# Patient Record
Sex: FEMALE | Race: WHITE | NOT HISPANIC OR LATINO | Employment: FULL TIME | URBAN - METROPOLITAN AREA
[De-identification: names, ages, dates, MRNs, and addresses within clinical notes are randomized per-mention and may not be internally consistent; named-entity substitution may affect disease eponyms.]

---

## 2017-05-15 ENCOUNTER — PROCEDURE VISIT (OUTPATIENT)
Dept: OBSTETRICS AND GYNECOLOGY | Facility: CLINIC | Age: 47
End: 2017-05-15

## 2017-05-15 VITALS
DIASTOLIC BLOOD PRESSURE: 70 MMHG | BODY MASS INDEX: 22.86 KG/M2 | SYSTOLIC BLOOD PRESSURE: 120 MMHG | WEIGHT: 129 LBS | HEIGHT: 63 IN

## 2017-05-15 DIAGNOSIS — Z86.19 HISTORY OF HERPES GENITALIS: ICD-10-CM

## 2017-05-15 DIAGNOSIS — Z01.419 WELL FEMALE EXAM WITH ROUTINE GYNECOLOGICAL EXAM: Primary | ICD-10-CM

## 2017-05-15 DIAGNOSIS — N95.1 HOT FLASH, MENOPAUSAL: ICD-10-CM

## 2017-05-15 PROCEDURE — 99396 PREV VISIT EST AGE 40-64: CPT | Performed by: OBSTETRICS & GYNECOLOGY

## 2017-05-15 RX ORDER — VALACYCLOVIR HYDROCHLORIDE 500 MG/1
TABLET, FILM COATED ORAL
COMMUNITY
Start: 2015-06-15 | End: 2017-05-15 | Stop reason: SDUPTHER

## 2017-05-15 RX ORDER — VALACYCLOVIR HYDROCHLORIDE 500 MG/1
500 TABLET, FILM COATED ORAL DAILY
Qty: 90 TABLET | Refills: 3 | Status: SHIPPED | OUTPATIENT
Start: 2017-05-15 | End: 2018-03-14 | Stop reason: SDUPTHER

## 2018-03-14 DIAGNOSIS — Z86.19 HISTORY OF HERPES GENITALIS: ICD-10-CM

## 2018-03-14 RX ORDER — VALACYCLOVIR HYDROCHLORIDE 500 MG/1
TABLET, FILM COATED ORAL
Qty: 90 TABLET | Refills: 3 | Status: SHIPPED | OUTPATIENT
Start: 2018-03-14 | End: 2018-06-25 | Stop reason: SDUPTHER

## 2018-06-12 ENCOUNTER — OFFICE VISIT (OUTPATIENT)
Dept: FAMILY MEDICINE CLINIC | Facility: CLINIC | Age: 48
End: 2018-06-12

## 2018-06-12 VITALS
RESPIRATION RATE: 16 BRPM | OXYGEN SATURATION: 98 % | TEMPERATURE: 98.3 F | HEART RATE: 62 BPM | WEIGHT: 130 LBS | SYSTOLIC BLOOD PRESSURE: 110 MMHG | BODY MASS INDEX: 23.92 KG/M2 | HEIGHT: 62 IN | DIASTOLIC BLOOD PRESSURE: 70 MMHG

## 2018-06-12 DIAGNOSIS — Z00.00 ANNUAL PHYSICAL EXAM: Primary | ICD-10-CM

## 2018-06-12 DIAGNOSIS — B18.1 HEPATITIS B CARRIER (HCC): ICD-10-CM

## 2018-06-12 LAB
25(OH)D3 SERPL-MCNC: 22.1 NG/ML
ALBUMIN SERPL-MCNC: 4.46 G/DL (ref 3.2–4.8)
ALBUMIN/GLOB SERPL: 1.5 G/DL (ref 1.5–2.5)
ALP SERPL-CCNC: 79 U/L (ref 25–100)
ALT SERPL W P-5'-P-CCNC: 35 U/L (ref 7–40)
ANION GAP SERPL CALCULATED.3IONS-SCNC: 9 MMOL/L (ref 3–11)
ARTICHOKE IGE QN: 122 MG/DL (ref 0–130)
AST SERPL-CCNC: 38 U/L (ref 0–33)
BASOPHILS # BLD AUTO: 0.02 10*3/MM3 (ref 0–0.2)
BASOPHILS NFR BLD AUTO: 0.4 % (ref 0–1)
BILIRUB BLD-MCNC: NEGATIVE MG/DL
BILIRUB SERPL-MCNC: 0.8 MG/DL (ref 0.3–1.2)
BUN BLD-MCNC: 11 MG/DL (ref 9–23)
BUN/CREAT SERPL: 19.3 (ref 7–25)
CALCIUM SPEC-SCNC: 9.5 MG/DL (ref 8.7–10.4)
CHLORIDE SERPL-SCNC: 102 MMOL/L (ref 99–109)
CHOLEST SERPL-MCNC: 198 MG/DL (ref 0–200)
CLARITY, POC: CLEAR
CO2 SERPL-SCNC: 30 MMOL/L (ref 20–31)
COLOR UR: YELLOW
CREAT BLD-MCNC: 0.57 MG/DL (ref 0.6–1.3)
DEPRECATED RDW RBC AUTO: 46 FL (ref 37–54)
EOSINOPHIL # BLD AUTO: 0.09 10*3/MM3 (ref 0–0.3)
EOSINOPHIL NFR BLD AUTO: 1.6 % (ref 0–3)
ERYTHROCYTE [DISTWIDTH] IN BLOOD BY AUTOMATED COUNT: 13.1 % (ref 11.3–14.5)
GFR SERPL CREATININE-BSD FRML MDRD: 113 ML/MIN/1.73
GLOBULIN UR ELPH-MCNC: 3 GM/DL
GLUCOSE BLD-MCNC: 85 MG/DL (ref 70–100)
GLUCOSE UR STRIP-MCNC: NEGATIVE MG/DL
HCT VFR BLD AUTO: 42.7 % (ref 34.5–44)
HDLC SERPL-MCNC: 78 MG/DL (ref 40–60)
HGB BLD-MCNC: 14.1 G/DL (ref 11.5–15.5)
IMM GRANULOCYTES # BLD: 0.01 10*3/MM3 (ref 0–0.03)
IMM GRANULOCYTES NFR BLD: 0.2 % (ref 0–0.6)
KETONES UR QL: NEGATIVE
LEUKOCYTE EST, POC: NEGATIVE
LYMPHOCYTES # BLD AUTO: 1.79 10*3/MM3 (ref 0.6–4.8)
LYMPHOCYTES NFR BLD AUTO: 32.1 % (ref 24–44)
MCH RBC QN AUTO: 31.8 PG (ref 27–31)
MCHC RBC AUTO-ENTMCNC: 33 G/DL (ref 32–36)
MCV RBC AUTO: 96.2 FL (ref 80–99)
MONOCYTES # BLD AUTO: 0.41 10*3/MM3 (ref 0–1)
MONOCYTES NFR BLD AUTO: 7.4 % (ref 0–12)
NEUTROPHILS # BLD AUTO: 3.26 10*3/MM3 (ref 1.5–8.3)
NEUTROPHILS NFR BLD AUTO: 58.5 % (ref 41–71)
NITRITE UR-MCNC: NEGATIVE MG/ML
PH UR: 7 [PH] (ref 5–8)
PLATELET # BLD AUTO: 194 10*3/MM3 (ref 150–450)
PMV BLD AUTO: 11.6 FL (ref 6–12)
POTASSIUM BLD-SCNC: 4.2 MMOL/L (ref 3.5–5.5)
PROT SERPL-MCNC: 7.5 G/DL (ref 5.7–8.2)
PROT UR STRIP-MCNC: NEGATIVE MG/DL
RBC # BLD AUTO: 4.44 10*6/MM3 (ref 3.89–5.14)
RBC # UR STRIP: NEGATIVE /UL
SODIUM BLD-SCNC: 141 MMOL/L (ref 132–146)
SP GR UR: 1.02 (ref 1–1.03)
TRIGL SERPL-MCNC: 49 MG/DL (ref 0–150)
TSH SERPL DL<=0.05 MIU/L-ACNC: 2 MIU/ML (ref 0.35–5.35)
UROBILINOGEN UR QL: NORMAL
WBC NRBC COR # BLD: 5.57 10*3/MM3 (ref 3.5–10.8)

## 2018-06-12 PROCEDURE — 84443 ASSAY THYROID STIM HORMONE: CPT | Performed by: FAMILY MEDICINE

## 2018-06-12 PROCEDURE — 82105 ALPHA-FETOPROTEIN SERUM: CPT | Performed by: FAMILY MEDICINE

## 2018-06-12 PROCEDURE — 80053 COMPREHEN METABOLIC PANEL: CPT | Performed by: FAMILY MEDICINE

## 2018-06-12 PROCEDURE — 80061 LIPID PANEL: CPT | Performed by: FAMILY MEDICINE

## 2018-06-12 PROCEDURE — 99396 PREV VISIT EST AGE 40-64: CPT | Performed by: FAMILY MEDICINE

## 2018-06-12 PROCEDURE — 93000 ELECTROCARDIOGRAM COMPLETE: CPT | Performed by: FAMILY MEDICINE

## 2018-06-12 PROCEDURE — 36415 COLL VENOUS BLD VENIPUNCTURE: CPT | Performed by: FAMILY MEDICINE

## 2018-06-12 PROCEDURE — 82306 VITAMIN D 25 HYDROXY: CPT | Performed by: FAMILY MEDICINE

## 2018-06-12 PROCEDURE — 81003 URINALYSIS AUTO W/O SCOPE: CPT | Performed by: FAMILY MEDICINE

## 2018-06-12 PROCEDURE — 85025 COMPLETE CBC W/AUTO DIFF WBC: CPT | Performed by: FAMILY MEDICINE

## 2018-06-12 PROCEDURE — 87517 HEPATITIS B DNA QUANT: CPT | Performed by: FAMILY MEDICINE

## 2018-06-13 LAB
AFP-TM SERPL-MCNC: 3.9 NG/ML (ref 0–8.3)
HBV DNA SERPL NAA+PROBE-ACNC: 2040 IU/ML
LOG10 HBV IU/ML: 3.31 LOG10 IU/ML
TEST INFORMATION: NORMAL

## 2018-06-25 ENCOUNTER — PROCEDURE VISIT (OUTPATIENT)
Dept: OBSTETRICS AND GYNECOLOGY | Facility: CLINIC | Age: 48
End: 2018-06-25

## 2018-06-25 VITALS — SYSTOLIC BLOOD PRESSURE: 100 MMHG | WEIGHT: 131 LBS | DIASTOLIC BLOOD PRESSURE: 64 MMHG | BODY MASS INDEX: 23.96 KG/M2

## 2018-06-25 DIAGNOSIS — Z01.419 WOMEN'S ANNUAL ROUTINE GYNECOLOGICAL EXAMINATION: Primary | ICD-10-CM

## 2018-06-25 DIAGNOSIS — Z98.890 STATUS POST ENDOMETRIAL ABLATION: ICD-10-CM

## 2018-06-25 DIAGNOSIS — Z86.19 HISTORY OF HERPES GENITALIS: ICD-10-CM

## 2018-06-25 DIAGNOSIS — I82.602 ARM VEIN BLOOD CLOT, LEFT: ICD-10-CM

## 2018-06-25 DIAGNOSIS — Z86.19 HISTORY OF HEPATITIS B: ICD-10-CM

## 2018-06-25 PROCEDURE — 99396 PREV VISIT EST AGE 40-64: CPT | Performed by: OBSTETRICS & GYNECOLOGY

## 2018-06-25 RX ORDER — VALACYCLOVIR HYDROCHLORIDE 500 MG/1
500 TABLET, FILM COATED ORAL DAILY
Qty: 90 TABLET | Refills: 3 | Status: SHIPPED | OUTPATIENT
Start: 2018-06-25 | End: 2019-06-26 | Stop reason: SDUPTHER

## 2018-06-25 NOTE — PROGRESS NOTES
Subjective   Chief Complaint   Patient presents with   • Gynecologic Exam     Brittaney Augustin is a 48 y.o. year old  presenting to be seen for her annual exam. Patient has using Essure for birth control and is undergone endometrial ablation.  She has no menstrual cycles at this time.  She has no GYN problems and presents for an annual exam. Patient has a history of hepatitis B.  She needs a mammogram and will schedule her own at [facility].    SEXUAL Hx:  She is currently sexually active.  In the past year there has not been new sexual partners.    Condoms are not typically used.  She would not like to be screened for STD's at today's exam.  Current birth control method: Essure.  She is happy with her current method of contraception and does not want to discuss alternative methods of contraception.  MENSTRUAL Hx:  No LMP recorded. Patient has had an ablation.  In the past 6 months her cycles have been absent.  Her menstrual flow has been absent.   Each month on average there are roughly 0 day(s) of very heavy flow.    Intermenstrual bleeding is absent.    Post-coital bleeding is absent.  Dysmenorrhea: none  PMS: none  Her cycles are not a source of concern for her that she wishes to discuss today.  HEALTH Hx:  She exercises regularly:yes.  She wears her seat belt:yes.  She has concerns about domestic violence: no.  OTHER COMPLAINTS:  Nothing else    The following portions of the patient's history were reviewed and updated as appropriate:problem list, current medications, allergies, past family history, past medical history, past social history and past surgical history.    Smoking status: Never Smoker                                                              Smokeless tobacco: Never Used                        Review of Systems  Review of Systems - History obtained from the patient  General ROS: negative  Psychological ROS: negative  ENT ROS: negative  Allergy and Immunology ROS:  negative  Hematological and Lymphatic ROS: positive for - bruising  Endocrine ROS: negative  Breast ROS: negative for breast lumps  Respiratory ROS: no cough, shortness of breath, or wheezing  Cardiovascular ROS: no chest pain or dyspnea on exertion  Gastrointestinal ROS: no abdominal pain, change in bowel habits, or black or bloody stools  Genito-Urinary ROS: no dysuria, trouble voiding, or hematuria  Musculoskeletal ROS: negative  Neurological ROS: no TIA or stroke symptoms  Dermatological ROS: negative        Objective   /64 (Patient Position: Sitting)   Wt 59.4 kg (131 lb)   BMI 23.96 kg/m²     General:  well developed; well nourished  no acute distress   Skin:  No suspicious lesions seen   Thyroid: normal to inspection and palpation   Breasts:  Examined in supine position  Symmetric without masses or skin dimpling  Nipples normal without inversion, lesions or discharge  There are no palpable axillary nodes   Abdomen: soft, non-tender; no masses  no umbilical or inginual hernias are present  no hepato-splenomegaly   Heart: regular rate and rhythm, S1, S2 normal, no murmur, click, rub or gallop   Lungs: clear to auscultation   Pelvis: Clinical staff was present for exam  External genitalia:  normal appearance of the external genitalia including Bartholin's and Metompkin's glands.  :  urethral meatus normal;  Vaginal:  normal pink mucosa without prolapse or lesions. discharge present -  white;  Cervix:  normal appearance. Pap smear was done  Uterus:  normal size, shape and consistency.  Adnexa:  normal bimanual exam of the adnexa.  Rectal:  digital rectal exam not performed; anus visually normal appearing.          Assessment/Plan   Brittaney was seen today for gynecologic exam.    Diagnoses and all orders for this visit:    Women's annual routine gynecological examination  -     Liquid-based Pap Smear, Screening    Status post endometrial ablation    History of herpes genitalis  -     valACYclovir (VALTREX)  500 MG tablet; Take 1 tablet by mouth Daily.    History of hepatitis B    Arm vein blood clot, left         Return in 1 year    This note was electronically signed.    Nikko Meza MD   June 25, 2018

## 2018-06-28 ENCOUNTER — OFFICE VISIT (OUTPATIENT)
Dept: FAMILY MEDICINE CLINIC | Facility: CLINIC | Age: 48
End: 2018-06-28

## 2018-06-28 VITALS
SYSTOLIC BLOOD PRESSURE: 100 MMHG | HEART RATE: 78 BPM | OXYGEN SATURATION: 98 % | TEMPERATURE: 99 F | DIASTOLIC BLOOD PRESSURE: 58 MMHG | HEIGHT: 62 IN | WEIGHT: 136.4 LBS | BODY MASS INDEX: 25.1 KG/M2 | RESPIRATION RATE: 20 BRPM

## 2018-06-28 DIAGNOSIS — L03.113 CELLULITIS OF RIGHT UPPER EXTREMITY: Primary | ICD-10-CM

## 2018-06-28 PROCEDURE — 99213 OFFICE O/P EST LOW 20 MIN: CPT | Performed by: FAMILY MEDICINE

## 2018-06-28 RX ORDER — CEPHALEXIN 500 MG/1
500 CAPSULE ORAL 2 TIMES DAILY
Qty: 10 CAPSULE | Refills: 0
Start: 2018-06-28 | End: 2018-10-17

## 2018-10-17 ENCOUNTER — OFFICE VISIT (OUTPATIENT)
Dept: FAMILY MEDICINE CLINIC | Facility: CLINIC | Age: 48
End: 2018-10-17

## 2018-10-17 VITALS
HEIGHT: 62 IN | DIASTOLIC BLOOD PRESSURE: 80 MMHG | BODY MASS INDEX: 24.77 KG/M2 | OXYGEN SATURATION: 97 % | HEART RATE: 91 BPM | RESPIRATION RATE: 20 BRPM | TEMPERATURE: 98.9 F | SYSTOLIC BLOOD PRESSURE: 118 MMHG | WEIGHT: 134.6 LBS

## 2018-10-17 DIAGNOSIS — K13.0 CHEILITIS: Primary | ICD-10-CM

## 2018-10-17 PROCEDURE — 99212 OFFICE O/P EST SF 10 MIN: CPT | Performed by: NURSE PRACTITIONER

## 2018-10-17 RX ORDER — INFLUENZA A VIRUS A/MICHIGAN/45/2015 X-275 (H1N1) ANTIGEN (FORMALDEHYDE INACTIVATED), INFLUENZA A VIRUS A/SINGAPORE/INFIMH-16-0019/2016 IVR-186 (H3N2) ANTIGEN (FORMALDEHYDE INACTIVATED), INFLUENZA B VIRUS B/PHUKET/3073/2013 ANTIGEN (FORMALDEHYDE INACTIVATED), AND INFLUENZA B VIRUS B/MARYLAND/15/2016 BX-69A ANTIGEN (FORMALDEHYDE INACTIVATED) 15; 15; 15; 15 UG/.5ML; UG/.5ML; UG/.5ML; UG/.5ML
INJECTION, SUSPENSION INTRAMUSCULAR
COMMUNITY
Start: 2018-10-13 | End: 2019-06-18

## 2018-10-17 NOTE — PROGRESS NOTES
"Subjective   Brittaney Augustin is a 48 y.o. female.   Chief Complaint   Patient presents with   • iritated lip      History of Present Illness   Lower lip is irritated - has cracks in it - started while on vacation - 10/06 [place] - it has gotten a little better.   Reports it started after using a lip stick.   It is painful with salty foods or high acid.   She has been using chapstick.       The following portions of the patient's history were reviewed and updated as appropriate: allergies, current medications, past family history, past medical history, past social history, past surgical history and problem list.    Review of Systems   Constitutional: Negative.    HENT: Negative.    Eyes: Negative.    Respiratory: Negative.    Cardiovascular: Negative.    Gastrointestinal: Negative.    Skin: Positive for color change.        Cracks in the lower lip -    Neurological: Negative.    Psychiatric/Behavioral: Negative.    Hx of herpes - takes daily valtrex. -     Objective   No Known Allergies  Visit Vitals  /80   Pulse 91   Temp 98.9 °F (37.2 °C) (Temporal Artery )   Resp 20   Ht 157.5 cm (62\")   Wt 61.1 kg (134 lb 9.6 oz)   SpO2 97%   BMI 24.62 kg/m²       Physical Exam   Constitutional: She is oriented to person, place, and time. She appears well-developed and well-nourished.   HENT:   Head: Normocephalic.   Eyes: Pupils are equal, round, and reactive to light.   Neck: Normal range of motion.   Cardiovascular: Normal rate and regular rhythm.    Pulmonary/Chest: Effort normal and breath sounds normal.   Neurological: She is alert and oriented to person, place, and time.   Skin: Skin is warm and dry. Capillary refill takes less than 2 seconds.   Cracks in the lower lip - consistent with common cheilitis   No active bleeding.      Psychiatric: She has a normal mood and affect. Her behavior is normal.   Vitals reviewed.      Assessment/Plan   Brittaney was seen today for iritated lip.    Diagnoses and all orders for " this visit:    Cheilitis    treatment of choice is Vasoline /petrolium jelly to the lip as a  barrier . Apply numerous times daily.     Follow up if you fail to improve.     Increase water intake.                Patient Instructions   Use vasoline lip therapy as needed to lips as a barrier.   Increase water intake         Shannon Willett, APRN

## 2019-05-08 ENCOUNTER — TRANSCRIBE ORDERS (OUTPATIENT)
Dept: ADMINISTRATIVE | Facility: HOSPITAL | Age: 49
End: 2019-05-08

## 2019-05-08 DIAGNOSIS — Z12.31 VISIT FOR SCREENING MAMMOGRAM: Primary | ICD-10-CM

## 2019-06-14 ENCOUNTER — HOSPITAL ENCOUNTER (OUTPATIENT)
Dept: MAMMOGRAPHY | Facility: HOSPITAL | Age: 49
Discharge: HOME | End: 2019-06-14
Admitting: OBSTETRICS & GYNECOLOGY

## 2019-06-14 ENCOUNTER — APPOINTMENT (OUTPATIENT)
Dept: OTHER | Facility: HOSPITAL | Age: 49
End: 2019-06-14

## 2019-06-14 DIAGNOSIS — Z92.89 H/O MAMMOGRAM: ICD-10-CM

## 2019-06-14 DIAGNOSIS — Z12.31 VISIT FOR SCREENING MAMMOGRAM: ICD-10-CM

## 2019-06-14 PROCEDURE — 77063 BREAST TOMOSYNTHESIS BI: CPT | Performed by: RADIOLOGY

## 2019-06-14 PROCEDURE — 77067 SCR MAMMO BI INCL CAD: CPT

## 2019-06-14 PROCEDURE — 77063 BREAST TOMOSYNTHESIS BI: CPT

## 2019-06-14 PROCEDURE — 77067 SCR MAMMO BI INCL CAD: CPT | Performed by: RADIOLOGY

## 2019-06-18 ENCOUNTER — OFFICE VISIT (OUTPATIENT)
Dept: FAMILY MEDICINE CLINIC | Facility: CLINIC | Age: 49
End: 2019-06-18

## 2019-06-18 VITALS
DIASTOLIC BLOOD PRESSURE: 76 MMHG | OXYGEN SATURATION: 98 % | HEIGHT: 62 IN | SYSTOLIC BLOOD PRESSURE: 120 MMHG | HEART RATE: 61 BPM | BODY MASS INDEX: 24.37 KG/M2 | WEIGHT: 132.4 LBS | RESPIRATION RATE: 20 BRPM

## 2019-06-18 DIAGNOSIS — B18.1 CHRONIC HEPATITIS B VIRUS INFECTION (HCC): ICD-10-CM

## 2019-06-18 DIAGNOSIS — Z00.00 HEALTHCARE MAINTENANCE: Primary | ICD-10-CM

## 2019-06-18 LAB
BILIRUB BLD-MCNC: NEGATIVE MG/DL
CLARITY, POC: CLEAR
COLOR UR: YELLOW
GLUCOSE UR STRIP-MCNC: NEGATIVE MG/DL
INR PPP: 1.03 (ref 0.85–1.16)
KETONES UR QL: NEGATIVE
LEUKOCYTE EST, POC: NEGATIVE
NITRITE UR-MCNC: NEGATIVE MG/ML
PH UR: 5.5 [PH] (ref 5–8)
PROT UR STRIP-MCNC: NEGATIVE MG/DL
PROTHROMBIN TIME: 13 SECONDS (ref 11.2–14.3)
RBC # UR STRIP: NEGATIVE /UL
SP GR UR: 1.01 (ref 1–1.03)
UROBILINOGEN UR QL: NORMAL

## 2019-06-18 PROCEDURE — 84550 ASSAY OF BLOOD/URIC ACID: CPT | Performed by: FAMILY MEDICINE

## 2019-06-18 PROCEDURE — 83036 HEMOGLOBIN GLYCOSYLATED A1C: CPT | Performed by: FAMILY MEDICINE

## 2019-06-18 PROCEDURE — 86140 C-REACTIVE PROTEIN: CPT | Performed by: FAMILY MEDICINE

## 2019-06-18 PROCEDURE — 36415 COLL VENOUS BLD VENIPUNCTURE: CPT | Performed by: FAMILY MEDICINE

## 2019-06-18 PROCEDURE — 90732 PPSV23 VACC 2 YRS+ SUBQ/IM: CPT | Performed by: FAMILY MEDICINE

## 2019-06-18 PROCEDURE — 87517 HEPATITIS B DNA QUANT: CPT | Performed by: FAMILY MEDICINE

## 2019-06-18 PROCEDURE — 84443 ASSAY THYROID STIM HORMONE: CPT | Performed by: FAMILY MEDICINE

## 2019-06-18 PROCEDURE — 90471 IMMUNIZATION ADMIN: CPT | Performed by: FAMILY MEDICINE

## 2019-06-18 PROCEDURE — 80061 LIPID PANEL: CPT | Performed by: FAMILY MEDICINE

## 2019-06-18 PROCEDURE — 81003 URINALYSIS AUTO W/O SCOPE: CPT | Performed by: FAMILY MEDICINE

## 2019-06-18 PROCEDURE — G0432 EIA HIV-1/HIV-2 SCREEN: HCPCS | Performed by: FAMILY MEDICINE

## 2019-06-18 PROCEDURE — 90472 IMMUNIZATION ADMIN EACH ADD: CPT | Performed by: FAMILY MEDICINE

## 2019-06-18 PROCEDURE — 85610 PROTHROMBIN TIME: CPT | Performed by: FAMILY MEDICINE

## 2019-06-18 PROCEDURE — 90715 TDAP VACCINE 7 YRS/> IM: CPT | Performed by: FAMILY MEDICINE

## 2019-06-18 PROCEDURE — 86803 HEPATITIS C AB TEST: CPT | Performed by: FAMILY MEDICINE

## 2019-06-18 PROCEDURE — 80053 COMPREHEN METABOLIC PANEL: CPT | Performed by: FAMILY MEDICINE

## 2019-06-18 PROCEDURE — 99396 PREV VISIT EST AGE 40-64: CPT | Performed by: FAMILY MEDICINE

## 2019-06-18 NOTE — PROGRESS NOTES
"Subjective   Brittaney Augustin is a 49 y.o. female.     History of Present Illness   New patient to me with previous care by Dr. Benton.  She is here for her annual fasting wellness evaluation.  She is amendable to updating her immunizations.  She voices no acute symptoms.  She continues care with her gynecologist.    Review of Systems   Constitutional: Negative.    HENT: Negative.    Eyes: Negative.    Respiratory: Negative.    Cardiovascular: Negative.    Gastrointestinal: Negative.         She is requesting evaluation for her chronic hepatitis B    Endocrine: Negative.    Genitourinary: Negative.    Musculoskeletal: Negative.    Skin: Negative.    Allergic/Immunologic: Negative.    Neurological: Negative.    Hematological: Negative.    Psychiatric/Behavioral: Negative.      Objective    /76   Pulse 61   Resp 20   Ht 157.5 cm (62\")   Wt 60.1 kg (132 lb 6.4 oz)   SpO2 98%   Breastfeeding? No   BMI 24.22 kg/m²   Physical Exam   Constitutional: She is oriented to person, place, and time. She appears well-developed and well-nourished. She is cooperative.   HENT:   Head: Normocephalic and atraumatic.   Right Ear: Hearing and external ear normal.   Left Ear: Hearing and external ear normal.   Nose: Nose normal.   Mouth/Throat: Uvula is midline, oropharynx is clear and moist and mucous membranes are normal.   Eyes: Conjunctivae and EOM are normal. Pupils are equal, round, and reactive to light. No scleral icterus.   Neck: Trachea normal and normal range of motion. Neck supple. No JVD present. Carotid bruit is not present. No thyromegaly present.   Cardiovascular: Normal rate, regular rhythm, normal heart sounds and intact distal pulses.   Pulmonary/Chest: Effort normal and breath sounds normal.   Abdominal: Soft. Bowel sounds are normal. There is no hepatosplenomegaly. There is no tenderness.   Musculoskeletal: Normal range of motion.   Lymphadenopathy:     She has no cervical adenopathy.   Neurological: " She is alert and oriented to person, place, and time. She has normal strength and normal reflexes. No sensory deficit. Gait normal.   Skin: Skin is warm and dry.   Psychiatric: She has a normal mood and affect. Her speech is normal and behavior is normal. Judgment and thought content normal. Cognition and memory are normal.   Nursing note and vitals reviewed.      Assessment/Plan   Diagnoses and all orders for this visit:    Healthcare maintenance  -     POC Urinalysis Dipstick, Automated  -     Comprehensive Metabolic Panel  -     CBC & Differential  -     Lipid Panel  -     TSH  -     Uric Acid  -     C-reactive Protein  -     HIV-1 / O / 2 Ag / Antibody 4th Generation  -     Hepatitis C Antibody  -     Hemoglobin A1c  -     Tdap Vaccine IM  - Vaccine counseling and current VIS is provided for immunizations administered today.  -     Cologuard - Stool, Per Rectum; Ordered    Chronic hepatitis B virus infection (CMS/HCC)  -     Comprehensive Metabolic Panel  -     Protime-INR  -     Hepatitis B DNA, Quantitative, PCR  -     Pneumococcal Polysaccharide Vaccine 23-Valent IM  - Vaccine counseling and current VIS is provided for immunizations administered today.  -     US Liver; Ordered  - Follow up with gastroenterology    The patient is here for a health maintenance visit.  Currently, the patient consumes a healthy diet and has an adequate exercise regimen. Screening lab work is ordered.  Immunizations are updated today.  Advice and education is given regarding nutrition, aerobic exercise, routine dental evaluations, routine eye exams, reproductive health, cardiovascular risk reduction, sunscreen use, self skin examination (annual dermatology evaluations) and seat belt use (general overall safety).  Further recommendations after lab evaluation.  Annual wellness evaluations recommended.

## 2019-06-19 LAB
ALBUMIN SERPL-MCNC: 4.7 G/DL (ref 3.5–5.2)
ALBUMIN/GLOB SERPL: 1.4 G/DL
ALP SERPL-CCNC: 80 U/L (ref 39–117)
ALT SERPL W P-5'-P-CCNC: 27 U/L (ref 1–33)
ANION GAP SERPL CALCULATED.3IONS-SCNC: 10.7 MMOL/L
AST SERPL-CCNC: 35 U/L (ref 1–32)
BILIRUB SERPL-MCNC: 0.4 MG/DL (ref 0.2–1.2)
BUN BLD-MCNC: 12 MG/DL (ref 6–20)
BUN/CREAT SERPL: 22.6 (ref 7–25)
CALCIUM SPEC-SCNC: 9.9 MG/DL (ref 8.6–10.5)
CHLORIDE SERPL-SCNC: 97 MMOL/L (ref 98–107)
CHOLEST SERPL-MCNC: 191 MG/DL (ref 0–200)
CO2 SERPL-SCNC: 30.3 MMOL/L (ref 22–29)
CREAT BLD-MCNC: 0.53 MG/DL (ref 0.57–1)
CRP SERPL-MCNC: 0.09 MG/DL (ref 0–0.5)
GFR SERPL CREATININE-BSD FRML MDRD: 122 ML/MIN/1.73
GFR SERPL CREATININE-BSD FRML MDRD: 148 ML/MIN/1.73
GLOBULIN UR ELPH-MCNC: 3.3 GM/DL
GLUCOSE BLD-MCNC: 87 MG/DL (ref 65–99)
HBA1C MFR BLD: 4.59 % (ref 4.8–5.6)
HCV AB SER DONR QL: NORMAL
HDLC SERPL-MCNC: 76 MG/DL (ref 40–60)
HIV1+2 AB SER QL: NORMAL
LDLC SERPL CALC-MCNC: 91 MG/DL (ref 0–100)
LDLC/HDLC SERPL: 1.2 {RATIO}
POTASSIUM BLD-SCNC: 4.2 MMOL/L (ref 3.5–5.2)
PROT SERPL-MCNC: 8 G/DL (ref 6–8.5)
SODIUM BLD-SCNC: 138 MMOL/L (ref 136–145)
TRIGL SERPL-MCNC: 120 MG/DL (ref 0–150)
TSH SERPL DL<=0.05 MIU/L-ACNC: 2.64 MIU/ML (ref 0.27–4.2)
URATE SERPL-MCNC: 3.6 MG/DL (ref 2.4–5.7)
VLDLC SERPL-MCNC: 24 MG/DL (ref 5–40)

## 2019-06-19 PROCEDURE — 85025 COMPLETE CBC W/AUTO DIFF WBC: CPT | Performed by: FAMILY MEDICINE

## 2019-06-20 LAB
BASOPHILS # BLD AUTO: 0.04 10*3/MM3 (ref 0–0.2)
BASOPHILS NFR BLD AUTO: 0.8 % (ref 0–1.5)
DEPRECATED RDW RBC AUTO: 47.6 FL (ref 37–54)
EOSINOPHIL # BLD AUTO: 0.15 10*3/MM3 (ref 0–0.4)
EOSINOPHIL NFR BLD AUTO: 2.8 % (ref 0.3–6.2)
ERYTHROCYTE [DISTWIDTH] IN BLOOD BY AUTOMATED COUNT: 12.6 % (ref 12.3–15.4)
HCT VFR BLD AUTO: 44.5 % (ref 34–46.6)
HGB BLD-MCNC: 14 G/DL (ref 12–15.9)
IMM GRANULOCYTES # BLD AUTO: 0.02 10*3/MM3 (ref 0–0.05)
IMM GRANULOCYTES NFR BLD AUTO: 0.4 % (ref 0–0.5)
LYMPHOCYTES # BLD AUTO: 2.21 10*3/MM3 (ref 0.7–3.1)
LYMPHOCYTES NFR BLD AUTO: 41.7 % (ref 19.6–45.3)
MCH RBC QN AUTO: 32.1 PG (ref 26.6–33)
MCHC RBC AUTO-ENTMCNC: 31.5 G/DL (ref 31.5–35.7)
MCV RBC AUTO: 102.1 FL (ref 79–97)
MONOCYTES # BLD AUTO: 0.38 10*3/MM3 (ref 0.1–0.9)
MONOCYTES NFR BLD AUTO: 7.2 % (ref 5–12)
NEUTROPHILS # BLD AUTO: 2.5 10*3/MM3 (ref 1.7–7)
NEUTROPHILS NFR BLD AUTO: 47.1 % (ref 42.7–76)
NRBC BLD AUTO-RTO: 0.4 /100 WBC (ref 0–0.2)
PLATELET # BLD AUTO: 198 10*3/MM3 (ref 140–450)
PMV BLD AUTO: 11.9 FL (ref 6–12)
RBC # BLD AUTO: 4.36 10*6/MM3 (ref 3.77–5.28)
WBC NRBC COR # BLD: 5.3 10*3/MM3 (ref 3.4–10.8)

## 2019-06-21 LAB
HBV DNA SERPL NAA+PROBE-ACNC: NORMAL IU/ML
LOG10 HBV IU/ML: 4.84 LOG10 IU/ML
TEST INFORMATION: NORMAL

## 2019-06-21 NOTE — PROGRESS NOTES
Your hepatitis B DNA count is abnormal and elevated compared to last year.  I recommend routine follow up with your gastroenterologist.  If you need assistance establishing care with a new gastroenterologist, please contact our office for assistance.  Otherwise the rest of your lab results are all stable and satisfactory.  If you have any questions or concerns, please don't hesitate to contact us.  Thank you.

## 2019-06-26 ENCOUNTER — OFFICE VISIT (OUTPATIENT)
Dept: OBSTETRICS AND GYNECOLOGY | Facility: CLINIC | Age: 49
End: 2019-06-26

## 2019-06-26 VITALS
DIASTOLIC BLOOD PRESSURE: 100 MMHG | SYSTOLIC BLOOD PRESSURE: 130 MMHG | BODY MASS INDEX: 23.34 KG/M2 | WEIGHT: 127.6 LBS

## 2019-06-26 DIAGNOSIS — Z01.419 WOMEN'S ANNUAL ROUTINE GYNECOLOGICAL EXAMINATION: Primary | ICD-10-CM

## 2019-06-26 DIAGNOSIS — Z86.19 HISTORY OF HERPES GENITALIS: ICD-10-CM

## 2019-06-26 PROCEDURE — 99396 PREV VISIT EST AGE 40-64: CPT | Performed by: OBSTETRICS & GYNECOLOGY

## 2019-06-26 RX ORDER — VALACYCLOVIR HYDROCHLORIDE 500 MG/1
500 TABLET, FILM COATED ORAL DAILY
Qty: 90 TABLET | Refills: 3 | Status: SHIPPED | OUTPATIENT
Start: 2019-06-26 | End: 2020-05-29 | Stop reason: SDUPTHER

## 2019-06-26 NOTE — PROGRESS NOTES
Subjective   Chief Complaint   Patient presents with   • Annual Exam     Brittaney Augustin is a 49 y.o. year old  menopausal female presenting to be seen for her annual exam.  This past year she has not been on hormone replacement therapy.  There has not been vaginal bleeding in the last 12 months.  Menopausal symptoms are present (hot flashes and vaginal dryness) but not affecting her quality of life .  Patient underwent a endometrial ablation in  and has had no vaginal bleeding since then.  Several years ago she developed hot flashes but these have subsequently subsided.  Patient does have occasional hot flash and some vaginal dryness.  This is not a problem at present.  Patient had a mammogram this month which showed a problem in the left breast.  She has a repeat diagnostic mammogram scheduled.  Patient is undergone Cologuard for rectal screening.    SEXUAL Hx:  She is currently sexually active.  In the past year there has not been new sexual partners.    Condoms are not typically used.  She would not like to be screened for STD's at today's exam.  HEALTH Hx:  She exercises regularly: yes.  She wears her seat belt:yes.  She has concerns about domestic violence: no.  She has noticed changes in height: no.  OTHER COMPLAINTS:  Nothing else    The following portions of the patient's history were reviewed and updated as appropriate:problem list, current medications, allergies, past family history, past medical history, past social history and past surgical history.    Social History    Tobacco Use      Smoking status: Never Smoker      Smokeless tobacco: Never Used      Tobacco comment: Never tried any form of tobacco.      Review of Systems  Review of Systems - History obtained from the patient  General ROS: negative  Psychological ROS: negative  ENT ROS: negative  Allergy and Immunology ROS: positive for - seasonal allergies  Hematological and Lymphatic ROS: positive for - bruising  Endocrine ROS:  negative  Breast ROS: negative for breast lumps  Diagnostic mammogram is scheduled  Respiratory ROS: no cough, shortness of breath, or wheezing  Cardiovascular ROS: no chest pain or dyspnea on exertion  Gastrointestinal ROS: no abdominal pain, change in bowel habits, or black or bloody stools  Genito-Urinary ROS: no dysuria, trouble voiding, or hematuria  Musculoskeletal ROS: negative  Neurological ROS: no TIA or stroke symptoms  Dermatological ROS: negative          Objective   /100 (BP Location: Right arm, Patient Position: Sitting, Cuff Size: Adult)   Wt 57.9 kg (127 lb 9.6 oz)   Breastfeeding? No   BMI 23.34 kg/m²      General:  well developed; well nourished  no acute distress   Skin:  No suspicious lesions seen   Thyroid: normal to inspection and palpation   Breasts:  Examined in supine position  Symmetric without masses or skin dimpling  Nipples normal without inversion, lesions or discharge  There are no palpable axillary nodes   Abdomen: soft, non-tender; no masses  no umbilical or inguinal hernias are present  no hepato-splenomegaly   Heart: regular rate and rhythm, S1, S2 normal, no murmur, click, rub or gallop   Lungs: clear to auscultation   Pelvis: Clinical staff was present for exam  External genitalia:  normal appearance of the external genitalia including Bartholin's and Rockwell's glands.  :  urethral meatus normal;  Vaginal:  normal pink mucosa without prolapse or lesions.  Cervix:  normal appearance. Pap smear was done  Uterus:  normal size, shape and consistency.  Adnexa:  normal bimanual exam of the adnexa.  Rectal:  digital rectal exam not performed; anus visually normal appearing.          Assessment/Plan   Brittaney was seen today for annual exam.    Diagnoses and all orders for this visit:    Women's annual routine gynecological examination  -     Liquid-based Pap Smear, Screening    History of herpes genitalis  -     valACYclovir (VALTREX) 500 MG tablet; Take 1 tablet by mouth  Daily.    Continue Valtrex as ordered  Diagnostic mammogram is ordered  Return in 1 year       This note was electronically signed.    Nikko Meza MD   June 26, 2019

## 2019-07-05 ENCOUNTER — HOSPITAL ENCOUNTER (OUTPATIENT)
Dept: MAMMOGRAPHY | Facility: HOSPITAL | Age: 49
Discharge: HOME | End: 2019-07-05
Admitting: RADIOLOGY

## 2019-07-05 ENCOUNTER — HOSPITAL ENCOUNTER (OUTPATIENT)
Dept: ULTRASOUND IMAGING | Facility: HOSPITAL | Age: 49
Discharge: HOME | End: 2019-07-05

## 2019-07-05 DIAGNOSIS — R92.8 ABNORMAL MAMMOGRAM: ICD-10-CM

## 2019-07-05 PROCEDURE — 76642 ULTRASOUND BREAST LIMITED: CPT

## 2019-07-05 PROCEDURE — 77065 DX MAMMO INCL CAD UNI: CPT | Performed by: RADIOLOGY

## 2019-07-05 PROCEDURE — 77061 BREAST TOMOSYNTHESIS UNI: CPT | Performed by: RADIOLOGY

## 2019-07-05 PROCEDURE — 77065 DX MAMMO INCL CAD UNI: CPT

## 2019-07-05 PROCEDURE — 76642 ULTRASOUND BREAST LIMITED: CPT | Performed by: RADIOLOGY

## 2019-07-05 PROCEDURE — G0279 TOMOSYNTHESIS, MAMMO: HCPCS

## 2019-07-12 ENCOUNTER — TELEPHONE (OUTPATIENT)
Dept: FAMILY MEDICINE CLINIC | Facility: CLINIC | Age: 49
End: 2019-07-12

## 2019-07-12 NOTE — TELEPHONE ENCOUNTER
Spoke to pt about results. Pt stated that she figured out how to view on My Chart.   Pt verbalized understanding.

## 2019-08-07 ENCOUNTER — APPOINTMENT (OUTPATIENT)
Dept: ULTRASOUND IMAGING | Facility: HOSPITAL | Age: 49
End: 2019-08-07

## 2019-12-07 ENCOUNTER — OFFICE VISIT (OUTPATIENT)
Dept: FAMILY MEDICINE CLINIC | Facility: CLINIC | Age: 49
End: 2019-12-07

## 2019-12-07 VITALS
WEIGHT: 122.4 LBS | HEIGHT: 62 IN | BODY MASS INDEX: 22.52 KG/M2 | HEART RATE: 73 BPM | OXYGEN SATURATION: 97 % | TEMPERATURE: 98.2 F | SYSTOLIC BLOOD PRESSURE: 108 MMHG | DIASTOLIC BLOOD PRESSURE: 70 MMHG | RESPIRATION RATE: 16 BRPM

## 2019-12-07 DIAGNOSIS — N30.00 ACUTE CYSTITIS WITHOUT HEMATURIA: ICD-10-CM

## 2019-12-07 DIAGNOSIS — R39.15 URINARY URGENCY: Primary | ICD-10-CM

## 2019-12-07 LAB
BILIRUB BLD-MCNC: NEGATIVE MG/DL
CLARITY, POC: ABNORMAL
COLOR UR: YELLOW
GLUCOSE UR STRIP-MCNC: NEGATIVE MG/DL
KETONES UR QL: NEGATIVE
LEUKOCYTE EST, POC: ABNORMAL
NITRITE UR-MCNC: NEGATIVE MG/ML
PH UR: 5 [PH] (ref 5–8)
PROT UR STRIP-MCNC: NEGATIVE MG/DL
RBC # UR STRIP: ABNORMAL /UL
SP GR UR: 1.02 (ref 1–1.03)
UROBILINOGEN UR QL: NORMAL

## 2019-12-07 PROCEDURE — 81003 URINALYSIS AUTO W/O SCOPE: CPT | Performed by: FAMILY MEDICINE

## 2019-12-07 PROCEDURE — 99213 OFFICE O/P EST LOW 20 MIN: CPT | Performed by: FAMILY MEDICINE

## 2019-12-07 RX ORDER — NITROFURANTOIN 25; 75 MG/1; MG/1
100 CAPSULE ORAL 2 TIMES DAILY
Qty: 14 CAPSULE | Refills: 3 | Status: SHIPPED | OUTPATIENT
Start: 2019-12-07 | End: 2019-12-14

## 2019-12-07 NOTE — PROGRESS NOTES
"Subjective   Brittaney Augustin is a 49 y.o. female seen today for Urinary Urgency.     The patient has a 2-day history of urinary urgency and mild dysuria.  She has a prior history of frequent urinary tract infections but that is improved greatly over the last 2 or 3 years.  She has had no fever or chills on this occasion and there is been no nausea vomiting or flank pain.  In the past she was given a prescription for Macrobid to take twice a day for 7 days.  This is always worked well.  She has a prior history of sensitivity to ciprofloxacin.  The patient has started working in [place] and has a high deductible.  She request that we give a refill should she develop a recurrence in the next year.       The following portions of the patient's history were reviewed and updated as appropriate: allergies, current medications, past social history and problem list.    Review of Systems   Constitutional: Negative for chills and fever.   Cardiovascular: Positive for palpitations.        The patient had a recent episode consistent with a panic attack.  That was associated with an increased heart rate that lasted for 2 or 3 minutes.  No associated chest pain or shortness of breath.   Endocrine: Negative for polydipsia.   Genitourinary: Positive for difficulty urinating and urgency. Negative for decreased urine volume, dyspareunia, dysuria, enuresis, flank pain, frequency, genital sores, hematuria, menstrual problem, pelvic pain, vaginal bleeding, vaginal discharge and vaginal pain.       Objective   /70   Pulse 73   Temp 98.2 °F (36.8 °C)   Resp 16   Ht 157.5 cm (62\")   Wt 55.5 kg (122 lb 6.4 oz)   SpO2 97%   BMI 22.39 kg/m²   Physical Exam   Constitutional: She appears well-developed and well-nourished.   Eyes: Pupils are equal, round, and reactive to light.   Neck: Normal range of motion. Neck supple.   Cardiovascular: Normal rate and regular rhythm.   No murmur heard.  Pulmonary/Chest: Effort normal and " breath sounds normal.   Abdominal: Soft.   Musculoskeletal: She exhibits no edema.   Nursing note and vitals reviewed.      Assessment/Plan   Problem List Items Addressed This Visit     None      Visit Diagnoses     Urinary urgency    -  Primary    Relevant Orders    POCT urinalysis dipstick, automated (Completed)    Acute cystitis without hematuria            Urinalysis revealed moderate blood and a small amount of leukocytes.  We will treat her for acute cystitis with Macrobid 100 mg twice a day for 7 days.  3 refills were given on the medication in case she develops future problems over the next year.  No culture was obtained today.

## 2020-02-06 ENCOUNTER — OFFICE VISIT (OUTPATIENT)
Dept: FAMILY MEDICINE CLINIC | Facility: CLINIC | Age: 50
End: 2020-02-06

## 2020-02-06 VITALS
SYSTOLIC BLOOD PRESSURE: 120 MMHG | WEIGHT: 126 LBS | TEMPERATURE: 98.6 F | BODY MASS INDEX: 23.19 KG/M2 | OXYGEN SATURATION: 99 % | DIASTOLIC BLOOD PRESSURE: 72 MMHG | RESPIRATION RATE: 16 BRPM | HEART RATE: 73 BPM | HEIGHT: 62 IN

## 2020-02-06 DIAGNOSIS — Z00.00 LABORATORY EXAMINATION ORDERED AS PART OF A ROUTINE GENERAL MEDICAL EXAMINATION: ICD-10-CM

## 2020-02-06 DIAGNOSIS — B18.1 HEPATITIS B CARRIER (HCC): ICD-10-CM

## 2020-02-06 DIAGNOSIS — Z00.00 ANNUAL PHYSICAL EXAM: Primary | ICD-10-CM

## 2020-02-06 PROBLEM — R42 VERTIGO: Status: ACTIVE | Noted: 2020-02-06

## 2020-02-06 PROCEDURE — 99396 PREV VISIT EST AGE 40-64: CPT | Performed by: PHYSICIAN ASSISTANT

## 2020-02-06 NOTE — PROGRESS NOTES
Subjective   Brittaney Augustin is a 50 y.o. female.     History of Present Illness   Brittaney Augustin 50 y.o. female who presents today for a new patient appointment.    she has a history of   Patient Active Problem List   Diagnosis   • Vertigo   .  she is here to establish care I reviewed the PFSH recorded today by my MA/LPN staff.   she   She has been feeling well.    She has Hep B ---born with it--Mom  Has hx DVT left arm 6 yrs ago; ? Coag work up done.  I do suggest this.  I also want a note she went to the ER at [facility] in [place] on 12/28/2019.  I can see some of those records and she also brought me a paper copy of her labs and I do want a note the MCV on this lab was 94.8 which is down from 102 from her [facility] labs, hemoglobin was normal, CMP normal renal functions I do want a note her ALT was 45, I can see in the notes they did a portable chest x-ray reported that normal, did an EKG and reported that no change  The following portions of the patient's history were reviewed and updated as appropriate: allergies, current medications, past family history, past medical history, past social history, past surgical history and problem list.    Review of Systems   Constitutional: Negative for activity change, appetite change and unexpected weight change.   HENT: Positive for congestion. Negative for nosebleeds and trouble swallowing.    Eyes: Negative for pain and visual disturbance.   Respiratory: Negative for chest tightness, shortness of breath and wheezing.    Cardiovascular: Negative for chest pain and palpitations.   Gastrointestinal: Negative for abdominal pain and blood in stool.   Endocrine: Negative.    Genitourinary: Negative for difficulty urinating and hematuria.   Musculoskeletal: Negative for joint swelling.   Skin: Negative for color change and rash.   Allergic/Immunologic: Negative.    Neurological: Negative for syncope and speech difficulty.        Vertigo   Hematological: Negative for  adenopathy.   Psychiatric/Behavioral: Negative for agitation and confusion.   All other systems reviewed and are negative.      Objective   Physical Exam   Constitutional: She is oriented to person, place, and time. She appears well-developed and well-nourished. No distress.   HENT:   Head: Normocephalic and atraumatic.   Right Ear: External ear normal.   Left Ear: External ear normal.   Nose: Nose normal.   Mouth/Throat: Oropharynx is clear and moist. No oropharyngeal exudate.   Ear fluid  Tonsil stone right   Eyes: Pupils are equal, round, and reactive to light. Conjunctivae and EOM are normal. Right eye exhibits no discharge. Left eye exhibits no discharge. No scleral icterus.   Neck: Normal range of motion. Neck supple. No tracheal deviation present. No thyromegaly present.   Cardiovascular: Normal rate, regular rhythm, normal heart sounds, intact distal pulses and normal pulses. Exam reveals no gallop.   No murmur heard.  Pulmonary/Chest: Effort normal and breath sounds normal. No respiratory distress. She has no wheezes. She has no rales.   Abdominal: Soft. Bowel sounds are normal. There is no tenderness.   Musculoskeletal: Normal range of motion. She exhibits no edema or tenderness.   Lymphadenopathy:     She has no cervical adenopathy.   Neurological: She is alert and oriented to person, place, and time. She displays normal reflexes. No sensory deficit. She exhibits normal muscle tone. Coordination normal.   Skin: Skin is warm. No rash noted. No erythema. No pallor.   Psychiatric: She has a normal mood and affect. Her behavior is normal. Judgment and thought content normal.   Nursing note and vitals reviewed.      Assessment/Plan   Brittaney was seen today for establish care and hx dvt.    Diagnoses and all orders for this visit:    Annual physical exam    Hepatitis B carrier (CMS/HCC)    Laboratory examination ordered as part of a routine general medical examination    Low glycemic index diet  Exercise 30  minutes most days of the week  Make sure you get results on any labs or tests we ordered today  We discussed medications and how to take them as prescribed  Sleep 6-8 hours each night if possible    LDL goal <100  LDL goal if heart disease <70  HDL goal >60  Triglyceride goal <150  BP goal =<130/80  Fasting glucose <100  Suggest seeing ENT for hx vertigo  Suggest seeing GI for hep B  Suggest seeing Hematologist for hx DVT

## 2020-02-07 LAB
ALBUMIN SERPL-MCNC: 4.4 G/DL (ref 3.5–5.2)
ALP SERPL-CCNC: 71 U/L (ref 39–117)
ALT SERPL-CCNC: 28 U/L (ref 1–33)
APPEARANCE UR: CLEAR
AST SERPL-CCNC: 32 U/L (ref 1–32)
BACTERIA #/AREA URNS HPF: NORMAL /HPF
BASOPHILS # BLD AUTO: 0.05 10*3/MM3 (ref 0–0.2)
BASOPHILS NFR BLD AUTO: 1 % (ref 0–1.5)
BILIRUB DIRECT SERPL-MCNC: <0.2 MG/DL (ref 0.2–0.3)
BILIRUB SERPL-MCNC: 0.4 MG/DL (ref 0.2–1.2)
BILIRUB UR QL STRIP: NEGATIVE
CASTS URNS MICRO: NORMAL
CHOLEST SERPL-MCNC: 188 MG/DL (ref 0–200)
COLOR UR: YELLOW
EOSINOPHIL # BLD AUTO: 0.17 10*3/MM3 (ref 0–0.4)
EOSINOPHIL NFR BLD AUTO: 3.4 % (ref 0.3–6.2)
EPI CELLS #/AREA URNS HPF: NORMAL /HPF
ERYTHROCYTE [DISTWIDTH] IN BLOOD BY AUTOMATED COUNT: 11.6 % (ref 12.3–15.4)
FOLATE SERPL-MCNC: >20 NG/ML (ref 4.78–24.2)
GLUCOSE UR QL: NEGATIVE
HCT VFR BLD AUTO: 41 % (ref 34–46.6)
HDLC SERPL-MCNC: 80 MG/DL (ref 40–60)
HGB BLD-MCNC: 14 G/DL (ref 12–15.9)
HGB UR QL STRIP: NEGATIVE
IMM GRANULOCYTES # BLD AUTO: 0.01 10*3/MM3 (ref 0–0.05)
IMM GRANULOCYTES NFR BLD AUTO: 0.2 % (ref 0–0.5)
KETONES UR QL STRIP: NEGATIVE
LDLC SERPL CALC-MCNC: 90 MG/DL (ref 0–100)
LEUKOCYTE ESTERASE UR QL STRIP: NEGATIVE
LYMPHOCYTES # BLD AUTO: 1.98 10*3/MM3 (ref 0.7–3.1)
LYMPHOCYTES NFR BLD AUTO: 39.5 % (ref 19.6–45.3)
MCH RBC QN AUTO: 32.4 PG (ref 26.6–33)
MCHC RBC AUTO-ENTMCNC: 34.1 G/DL (ref 31.5–35.7)
MCV RBC AUTO: 94.9 FL (ref 79–97)
MONOCYTES # BLD AUTO: 0.52 10*3/MM3 (ref 0.1–0.9)
MONOCYTES NFR BLD AUTO: 10.4 % (ref 5–12)
NEUTROPHILS # BLD AUTO: 2.28 10*3/MM3 (ref 1.7–7)
NEUTROPHILS NFR BLD AUTO: 45.5 % (ref 42.7–76)
NITRITE UR QL STRIP: NEGATIVE
NRBC BLD AUTO-RTO: 0.2 /100 WBC (ref 0–0.2)
PH UR STRIP: 7.5 [PH] (ref 5–8)
PLATELET # BLD AUTO: 201 10*3/MM3 (ref 140–450)
PROT UR QL STRIP: NEGATIVE
RBC # BLD AUTO: 4.32 10*6/MM3 (ref 3.77–5.28)
RBC #/AREA URNS HPF: NORMAL /HPF
SP GR UR: 1.02 (ref 1–1.03)
T3FREE SERPL-MCNC: 3.2 PG/ML (ref 2–4.4)
T4 FREE SERPL-MCNC: 1.4 NG/DL (ref 0.93–1.7)
TRIGL SERPL-MCNC: 88 MG/DL (ref 0–150)
TSH SERPL DL<=0.005 MIU/L-ACNC: 1.44 UIU/ML (ref 0.27–4.2)
UROBILINOGEN UR STRIP-MCNC: NORMAL MG/DL
VIT B12 SERPL-MCNC: >2000 PG/ML (ref 211–946)
VLDLC SERPL CALC-MCNC: 17.6 MG/DL
WBC # BLD AUTO: 5.01 10*3/MM3 (ref 3.4–10.8)
WBC #/AREA URNS HPF: NORMAL /HPF

## 2020-05-29 ENCOUNTER — TELEPHONE (OUTPATIENT)
Dept: OBSTETRICS AND GYNECOLOGY | Facility: CLINIC | Age: 50
End: 2020-05-29

## 2020-05-29 DIAGNOSIS — Z86.19 HISTORY OF HERPES GENITALIS: ICD-10-CM

## 2020-05-29 RX ORDER — VALACYCLOVIR HYDROCHLORIDE 500 MG/1
500 TABLET, FILM COATED ORAL DAILY
Qty: 90 TABLET | Refills: 0 | Status: SHIPPED | OUTPATIENT
Start: 2020-05-29 | End: 2020-08-12 | Stop reason: SDUPTHER

## 2020-05-29 NOTE — TELEPHONE ENCOUNTER
Patient is no longer using [facility] for her medication and patient is asking for refill request for Valtrex to be sent to [facility]. Please call.

## 2020-08-12 DIAGNOSIS — Z86.19 HISTORY OF HERPES GENITALIS: ICD-10-CM

## 2020-08-12 RX ORDER — VALACYCLOVIR HYDROCHLORIDE 500 MG/1
500 TABLET, FILM COATED ORAL DAILY
Qty: 90 TABLET | Refills: 0 | Status: SHIPPED | OUTPATIENT
Start: 2020-08-12 | End: 2020-10-07 | Stop reason: SDUPTHER

## 2020-08-12 NOTE — TELEPHONE ENCOUNTER
Pt called stating that she has lost her job and doesn't have health ins.  Pt asking for 1 year's worth of med.

## 2020-10-07 ENCOUNTER — OFFICE VISIT (OUTPATIENT)
Dept: OBSTETRICS AND GYNECOLOGY | Facility: CLINIC | Age: 50
End: 2020-10-07

## 2020-10-07 VITALS
BODY MASS INDEX: 24.25 KG/M2 | HEIGHT: 62 IN | RESPIRATION RATE: 14 BRPM | WEIGHT: 131.8 LBS | SYSTOLIC BLOOD PRESSURE: 118 MMHG | DIASTOLIC BLOOD PRESSURE: 80 MMHG

## 2020-10-07 DIAGNOSIS — Z86.19 HISTORY OF HERPES GENITALIS: ICD-10-CM

## 2020-10-07 DIAGNOSIS — Z01.419 WELL WOMAN EXAM WITH ROUTINE GYNECOLOGICAL EXAM: Primary | ICD-10-CM

## 2020-10-07 DIAGNOSIS — Z01.419 WELL WOMAN EXAM WITH ROUTINE GYNECOLOGICAL EXAM: ICD-10-CM

## 2020-10-07 PROCEDURE — 99396 PREV VISIT EST AGE 40-64: CPT | Performed by: OBSTETRICS & GYNECOLOGY

## 2020-10-07 RX ORDER — VALACYCLOVIR HYDROCHLORIDE 500 MG/1
500 TABLET, FILM COATED ORAL DAILY
Qty: 90 TABLET | Refills: 3 | Status: SHIPPED | OUTPATIENT
Start: 2020-10-07 | End: 2020-10-07 | Stop reason: SDUPTHER

## 2020-10-07 RX ORDER — VALACYCLOVIR HYDROCHLORIDE 500 MG/1
500 TABLET, FILM COATED ORAL DAILY
Qty: 90 TABLET | Refills: 3 | Status: SHIPPED | OUTPATIENT
Start: 2020-10-07 | End: 2021-10-12 | Stop reason: SDUPTHER

## 2020-10-07 NOTE — PROGRESS NOTES
Subjective   Chief Complaint   Patient presents with   • Gynecologic Exam     Patient is here today for her annual and pap smear. Patient has a rash on her buttocks.     Brittaney Augustin is a 50 y.o. year old  menopausal female presenting to be seen for her annual exam.  This past year she has not been on hormone replacement therapy.  There has not been vaginal bleeding in the last 12 months.  Menopausal symptoms are not present.  Patient has no GYN problems.  She is complaining of a area of itching when she scratches developed some type of rash that quickly goes away.  She has a history of herpes but she does not think this is related.  She is having minimal menopausal symptoms and no vaginal bleeding.  Patient is current on her mammogram and has done a Cologuard for rectal cancer screening.   Adult Visits - 40 to 64 Years - Counseling/Anticipatory Guidance: Nutrition, physical activity, healthy weight, injury prevention, misuse of tobacco, alcohol and drugs, sexual behavior and STDs, contraception, dental health, mental health, immunizations, screenings.      SEXUAL Hx:  She is currently sexually active.  In the past year there has not been new sexual partners.    Condoms are not typically used.  She would not like to be screened for STD's at today's exam.  HEALTH Hx:  She exercises regularly: no (but is planning to start exercising more ).  She wears her seat belt:yes.  She has concerns about domestic violence: no.  She has noticed changes in height: no.  OTHER COMPLAINTS:  Nothing else    The following portions of the patient's history were reviewed and updated as appropriate:problem list, current medications, allergies, past family history, past medical history, past social history and past surgical history.    Social History    Tobacco Use      Smoking status: Never Smoker      Smokeless tobacco: Never Used      Tobacco comment: Never tried any form of tobacco.      Review of Systems  Review of  "Systems - History obtained from the patient  General ROS: negative  Psychological ROS: negative  ENT ROS: positive for - vertigo  Allergy and Immunology ROS: positive for - seasonal allergies  Hematological and Lymphatic ROS: positive for - bruising  Endocrine ROS: negative  Breast ROS: negative for breast lumps  Respiratory ROS: no cough, shortness of breath, or wheezing  Cardiovascular ROS: no chest pain or dyspnea on exertion  Gastrointestinal ROS: no abdominal pain, change in bowel habits, or black or bloody stools  Genito-Urinary ROS: no dysuria, trouble voiding, or hematuria  Musculoskeletal ROS: negative  Neurological ROS: no TIA or stroke symptoms  Dermatological ROS: negative          Objective   /80 (BP Location: Right arm, Patient Position: Sitting, Cuff Size: Small Adult)   Resp 14   Ht 157.5 cm (62\")   Wt 59.8 kg (131 lb 12.8 oz)   Breastfeeding No   BMI 24.11 kg/m²     General:  well developed; well nourished  no acute distress   Skin:  No suspicious lesions seen   Thyroid: not examined   Breasts:  Examined in supine position  Symmetric without masses or skin dimpling  Nipples normal without inversion, lesions or discharge  There are no palpable axillary nodes   Abdomen: soft, non-tender; no masses  no umbilical or inguinal hernias are present  no hepato-splenomegaly   Heart: regular rate and rhythm, S1, S2 normal, no murmur, click, rub or gallop   Lungs: clear to auscultation   Pelvis: Clinical staff was present for exam  External genitalia:  normal appearance of the external genitalia including Bartholin's and Sutherlin's glands.  :  urethral meatus normal;  Vaginal:  normal pink mucosa without prolapse or lesions.  Cervix:  normal appearance. Pap smear was done  Uterus:  normal size, shape and consistency.  Adnexa:  normal bimanual exam of the adnexa.  Rectal:  digital rectal exam not performed; anus visually normal appearing.          Assessment/Plan   Brittaney was seen today for " gynecologic exam.    Diagnoses and all orders for this visit:    Well woman exam with routine gynecological exam  -     Pap IG, Rfx HPV ASCU; Future    History of herpes genitalis  -     Discontinue: valACYclovir (VALTREX) 500 MG tablet; Take 1 tablet by mouth Daily. Patient will need an appointment for additional refills  -     valACYclovir (VALTREX) 500 MG tablet; Take 1 tablet by mouth Daily. Patient will need an appointment for additional refills    No area of concern was noted on the vaginal area or buttocks  Return in 1 to 3 years  Mammogram yearly       This note was electronically signed.    Nikko Meza MD   October 7, 2020

## 2020-11-09 ENCOUNTER — TELEPHONE (OUTPATIENT)
Dept: OBSTETRICS AND GYNECOLOGY | Facility: CLINIC | Age: 50
End: 2020-11-09

## 2021-03-26 ENCOUNTER — BULK ORDERING (OUTPATIENT)
Dept: CASE MANAGEMENT | Facility: OTHER | Age: 51
End: 2021-03-26

## 2021-03-26 DIAGNOSIS — Z23 IMMUNIZATION DUE: ICD-10-CM

## 2021-10-12 ENCOUNTER — OFFICE VISIT (OUTPATIENT)
Dept: OBSTETRICS AND GYNECOLOGY | Facility: CLINIC | Age: 51
End: 2021-10-12

## 2021-10-12 VITALS
SYSTOLIC BLOOD PRESSURE: 122 MMHG | DIASTOLIC BLOOD PRESSURE: 78 MMHG | WEIGHT: 148.8 LBS | BODY MASS INDEX: 27.38 KG/M2 | HEIGHT: 62 IN

## 2021-10-12 DIAGNOSIS — Z01.419 WELL WOMAN EXAM WITH ROUTINE GYNECOLOGICAL EXAM: Primary | ICD-10-CM

## 2021-10-12 DIAGNOSIS — Z86.19 HISTORY OF HERPES GENITALIS: ICD-10-CM

## 2021-10-12 DIAGNOSIS — Z98.890 STATUS POST ENDOMETRIAL ABLATION: ICD-10-CM

## 2021-10-12 DIAGNOSIS — Z12.31 SCREENING MAMMOGRAM, ENCOUNTER FOR: ICD-10-CM

## 2021-10-12 PROCEDURE — 99396 PREV VISIT EST AGE 40-64: CPT | Performed by: OBSTETRICS & GYNECOLOGY

## 2021-10-12 RX ORDER — VALACYCLOVIR HYDROCHLORIDE 500 MG/1
TABLET, FILM COATED ORAL
Qty: 90 TABLET | Refills: 3 | Status: SHIPPED | OUTPATIENT
Start: 2021-10-12

## 2021-10-12 RX ORDER — VALACYCLOVIR HYDROCHLORIDE 500 MG/1
500 TABLET, FILM COATED ORAL DAILY
Qty: 90 TABLET | Refills: 3 | Status: SHIPPED | OUTPATIENT
Start: 2021-10-12 | End: 2021-10-12 | Stop reason: SDUPTHER

## 2021-10-12 NOTE — PROGRESS NOTES
Subjective   Chief Complaint   Patient presents with   • Annual Exam     inflammation between buttocks     Brittaney Augustin is a 51 y.o. year old  presenting to be seen for her annual exam.  Patient is post ablation and has had no bleeding since procedure.  She is having minimal menopausal symptoms.  She returns today for an annual exam.  She has not had a mammogram in 3 years.  She is willing to schedule one now.  She is current on a Cologuard for colon cancer screening.  She is having no other GYN problems.  Patient is positive for hepatitis B but has had no sequelae.  Patient is requesting a refill on Valtrex  Adult Visits - 40 to 64 Years - Counseling/Anticipatory Guidance: Nutrition, physical activity, healthy weight, injury prevention, misuse of tobacco, alcohol and drugs, sexual behavior and STDs, contraception, dental health, mental health, immunizations, screenings for women: Breast cancer and self breast exams.     SEXUAL Hx:  She is currently sexually active.  In the past year there has not been new sexual partners.    Condoms are not typically used.  She would not like to be screened for STD's at today's exam.  Current birth control method: Essure.  She is happy with her current method of contraception and does not want to discuss alternative methods of contraception.  MENSTRUAL Hx:  No LMP recorded. Patient has had an ablation.  In the past 6 months her cycles have been absent.  Her menstrual flow has been absent.   Each month on average there are roughly 0 day(s) of very heavy flow.    Intermenstrual bleeding is absent.    Post-coital bleeding is absent.  Dysmenorrhea: none  PMS: none  Her cycles are not a source of concern for her that she wishes to discuss today.  HEALTH Hx:  She exercises regularly:no (and has no plans to become more active).  She wears her seat belt:yes.  She has concerns about domestic violence: no.  OTHER COMPLAINTS:  Nothing else    The following portions of the  "patient's history were reviewed and updated as appropriate:problem list, current medications, allergies, past family history, past medical history, past social history and past surgical history.    Social History    Tobacco Use      Smoking status: Never Smoker      Smokeless tobacco: Never Used      Tobacco comment: Never tried any form of tobacco.    Review of Systems  Review of Systems - History obtained from the patient  General ROS: positive for  - weight gain  Psychological ROS: positive for - anxiety  ENT ROS: negative  Allergy and Immunology ROS: positive for - seasonal allergies  Hematological and Lymphatic ROS: negative  Endocrine ROS: negative  Breast ROS: negative for breast lumps  Respiratory ROS: no cough, shortness of breath, or wheezing  Cardiovascular ROS: positive for - palpitations  Gastrointestinal ROS: no abdominal pain, change in bowel habits, or black or bloody stools  Genito-Urinary ROS: no dysuria, trouble voiding, or hematuria  Musculoskeletal ROS: negative  Neurological ROS: no TIA or stroke symptoms  Dermatological ROS: negative        Objective   /78   Ht 157.5 cm (62\")   Wt 67.5 kg (148 lb 12.8 oz)   Breastfeeding No   BMI 27.22 kg/m²     General:  well developed; well nourished  no acute distress   Skin:  No suspicious lesions seen   Thyroid: not examined   Breasts:  Examined in supine position  Symmetric without masses or skin dimpling  Nipples normal without inversion, lesions or discharge  There are no palpable axillary nodes   Abdomen: soft, non-tender; no masses  no umbilical or inguinal hernias are present  no hepato-splenomegaly   Heart: regular rate and rhythm, S1, S2 normal, no murmur, click, rub or gallop   Lungs: clear to auscultation   Pelvis: Clinical staff was present for exam  External genitalia:  normal appearance of the external genitalia including Bartholin's and Keiser's glands.  :  urethral meatus normal;  Vaginal:  normal pink mucosa without prolapse or " lesions.  Cervix:  normal appearance. Pap smear was done  Uterus:  normal size, shape and consistency.  Adnexa:  normal bimanual exam of the adnexa.  Rectal:  digital rectal exam not performed; anus visually normal appearing.          Assessment/Plan   Diagnoses and all orders for this visit:    1. Well woman exam with routine gynecological exam (Primary)    2. Status post endometrial ablation    3. Screening mammogram, encounter for  -     Mammo Screening Digital Tomosynthesis Bilateral With CAD; Future    4. History of herpes genitalis  -     Discontinue: valACYclovir (VALTREX) 500 MG tablet; Take 1 tablet by mouth Daily. Patient will need an appointment for additional refills  Dispense: 90 tablet; Refill: 3  -     valACYclovir (VALTREX) 500 MG tablet; Take 1 tablet by mouth daily  Dispense: 90 tablet; Refill: 3         Return in 1 year  This note was electronically signed.    Nikko Meza MD   October 12, 2021

## 2021-10-20 ENCOUNTER — OFFICE VISIT (OUTPATIENT)
Dept: FAMILY MEDICINE CLINIC | Facility: CLINIC | Age: 51
End: 2021-10-20

## 2021-10-20 VITALS
BODY MASS INDEX: 26.87 KG/M2 | RESPIRATION RATE: 16 BRPM | DIASTOLIC BLOOD PRESSURE: 86 MMHG | WEIGHT: 146 LBS | OXYGEN SATURATION: 97 % | SYSTOLIC BLOOD PRESSURE: 130 MMHG | HEART RATE: 80 BPM | TEMPERATURE: 97.5 F | HEIGHT: 62 IN

## 2021-10-20 DIAGNOSIS — Z00.00 LABORATORY EXAMINATION ORDERED AS PART OF A ROUTINE GENERAL MEDICAL EXAMINATION: Primary | ICD-10-CM

## 2021-10-20 DIAGNOSIS — R00.2 PALPITATIONS: ICD-10-CM

## 2021-10-20 DIAGNOSIS — B18.1 HEPATITIS B CARRIER (HCC): ICD-10-CM

## 2021-10-20 PROBLEM — R07.89 ATYPICAL CHEST PAIN: Status: ACTIVE | Noted: 2019-12-28

## 2021-10-20 PROBLEM — I82.409 DEEP VEIN THROMBOSIS (DVT): Status: ACTIVE | Noted: 2021-10-20

## 2021-10-20 PROBLEM — R03.0 ELEVATED BLOOD PRESSURE READING: Status: ACTIVE | Noted: 2019-12-28

## 2021-10-20 PROCEDURE — 90471 IMMUNIZATION ADMIN: CPT | Performed by: PHYSICIAN ASSISTANT

## 2021-10-20 PROCEDURE — 90686 IIV4 VACC NO PRSV 0.5 ML IM: CPT | Performed by: PHYSICIAN ASSISTANT

## 2021-10-20 PROCEDURE — 99396 PREV VISIT EST AGE 40-64: CPT | Performed by: PHYSICIAN ASSISTANT

## 2021-10-20 RX ORDER — LORATADINE 10 MG/1
10 TABLET ORAL DAILY
COMMUNITY

## 2021-10-20 NOTE — PATIENT INSTRUCTIONS
Dyslipidemia  Dyslipidemia is an imbalance of waxy, fat-like substances (lipids) in the blood. The body needs lipids in small amounts. Dyslipidemia often involves a high level of cholesterol or triglycerides, which are types of lipids.  Common forms of dyslipidemia include:  · High levels of LDL cholesterol. LDL is the type of cholesterol that causes fatty deposits (plaques) to build up in the blood vessels that carry blood away from your heart (arteries).  · Low levels of HDL cholesterol. HDL cholesterol is the type of cholesterol that protects against heart disease. High levels of HDL remove the LDL buildup from arteries.  · High levels of triglycerides. Triglycerides are a fatty substance in the blood that is linked to a buildup of plaques in the arteries.  What are the causes?  Primary dyslipidemia is caused by changes (mutations) in genes that are passed down through families (inherited). These mutations cause several types of dyslipidemia.  Secondary dyslipidemia is caused by lifestyle choices and diseases that lead to dyslipidemia, such as:  · Eating a diet that is high in animal fat.  · Not getting enough exercise.  · Having diabetes, kidney disease, liver disease, or thyroid disease.  · Drinking large amounts of alcohol.  · Using certain medicines.  What increases the risk?  You are more likely to develop this condition if you are an older man or if you are a woman who has gone through menopause. Other risk factors include:  · Having a family history of dyslipidemia.  · Taking certain medicines, including birth control pills, steroids, some diuretics, and beta-blockers.  · Smoking cigarettes.  · Eating a high-fat diet.  · Having certain medical conditions such as diabetes, polycystic ovary syndrome (PCOS), kidney disease, liver disease, or hypothyroidism.  · Not exercising regularly.  · Being overweight or obese with too much belly fat.  What are the signs or symptoms?  In most cases, dyslipidemia does not  usually cause any symptoms.  In severe cases, very high lipid levels can cause:  · Fatty bumps under the skin (xanthomas).  · White or gray ring around the black center (pupil) of the eye.  Very high triglyceride levels can cause inflammation of the pancreas (pancreatitis).  How is this diagnosed?  Your health care provider may diagnose dyslipidemia based on a routine blood test (fasting blood test). Because most people do not have symptoms of the condition, this blood testing (lipid profile) is done on adults age 20 and older and is repeated every 5 years. This test checks:  · Total cholesterol. This measures the total amount of cholesterol in your blood, including LDL cholesterol, HDL cholesterol, and triglycerides. A healthy number is below 200.  · LDL cholesterol. The target number for LDL cholesterol is different for each person, depending on individual risk factors. Ask your health care provider what your LDL cholesterol should be.  · HDL cholesterol. An HDL level of 60 or higher is best because it helps to protect against heart disease. A number below 40 for men or below 50 for women increases the risk for heart disease.  · Triglycerides. A healthy triglyceride number is below 150.  If your lipid profile is abnormal, your health care provider may do other blood tests.  How is this treated?  Treatment depends on the type of dyslipidemia that you have and your other risk factors for heart disease and stroke. Your health care provider will have a target range for your lipid levels based on this information.  For many people, this condition may be treated by lifestyle changes, such as diet and exercise. Your health care provider may recommend that you:  · Get regular exercise.  · Make changes to your diet.  · Quit smoking if you smoke.  If diet changes and exercise do not help you reach your goals, your health care provider may also prescribe medicine to lower lipids. The most commonly prescribed type of medicine  lowers your LDL cholesterol (statin drug). If you have a high triglyceride level, your provider may prescribe another type of drug (fibrate) or an omega-3 fish oil supplement, or both.  Follow these instructions at home:    Eating and drinking  · Follow instructions from your health care provider or dietitian about eating or drinking restrictions.  · Eat a healthy diet as told by your health care provider. This can help you reach and maintain a healthy weight, lower your LDL cholesterol, and raise your HDL cholesterol. This may include:  ? Limiting your calories, if you are overweight.  ? Eating more fruits, vegetables, whole grains, fish, and lean meats.  ? Limiting saturated fat, trans fat, and cholesterol.  · If you drink alcohol:  ? Limit how much you use.  ? Be aware of how much alcohol is in your drink. In the [place] one drink equals one 12 oz bottle of beer (355 mL), one 5 oz glass of wine (148 mL), or one 1½ oz glass of hard liquor (44 mL).  · Do not drink alcohol if:  ? Your health care provider tells you not to drink.  ? You are pregnant, may be pregnant, or are planning to become pregnant.  Activity  · Get regular exercise. Start an exercise and strength training program as told by your health care provider. Ask your health care provider what activities are safe for you. Your health care provider may recommend:  ? 30 minutes of aerobic activity 4-6 days a week. Brisk walking is an example of aerobic activity.  ? Strength training 2 days a week.  General instructions  · Do not use any products that contain nicotine or tobacco, such as cigarettes, e-cigarettes, and chewing tobacco. If you need help quitting, ask your health care provider.  · Take over-the-counter and prescription medicines only as told by your health care provider. This includes supplements.  · Keep all follow-up visits as told by your health care provider.  Contact a health care provider if:  · You are:  ? Having trouble sticking to your  exercise or diet plan.  ? Struggling to quit smoking or control your use of alcohol.  Summary  · Dyslipidemia often involves a high level of cholesterol or triglycerides, which are types of lipids.  · Treatment depends on the type of dyslipidemia that you have and your other risk factors for heart disease and stroke.  · For many people, treatment starts with lifestyle changes, such as diet and exercise.  · Your health care provider may prescribe medicine to lower lipids.  This information is not intended to replace advice given to you by your health care provider. Make sure you discuss any questions you have with your health care provider.  Document Revised: 08/12/2019 Document Reviewed: 07/19/2019  [employer] Patient Education © 2021 [employer]

## 2021-10-20 NOTE — PROGRESS NOTES
Subjective   Brittaney Augustin is a 51 y.o. female.     History of Present Illness   Brittaney Augustin 51 y.o. female who presents for an Annual Wellness Visit.  she has a history of   Patient Active Problem List   Diagnosis   • Vertigo   • Atypical chest pain   • Chronic hepatitis B virus infection (HCC)   • Deep vein thrombosis (DVT) (HCC)   • Elevated blood pressure reading   .  she has been feeling fairly well.  Labs results discussed from ER  Plan to update vaccines if needed today.  I  reviewed health maintenance with her as part of my preventative care plan.    Health Habits:  Dental Exam. up to date  Eye Exam. up to date  Exercise: 5 times/week.  Current exercise activities include: walking    EKG--just had  Brittaney Augustin 51 y.o. female presents today for Emergency Room follow up.  she was treated 10-8-21 for palpitations  .  I reviewed all of the labs and diagnostic testing and noted:  CXR, D Dimer, EKG, CMP  The patient's medications were not changed:  Current outpatient and discharge medications have been reconciled for the patient.  Reviewed by: Elmira Russ PA-C    she does have a follow up appointment with a specialist:       Hx DVT in past---suggest hematology refer  Hx hep B vickie    Saw DR Davidson--cardio after ER for tachycardia and palp 10-14-21    The patient presents with recent palpitations and tachycardia with PVCs noted on cardiac monitoring and her 12 EKGs. It does appear that her episode prompting her ER visit was likely due to a combination of dehydration and anxiety/stress occurring while at work. Her evaluation in the emergency room was unremarkable but it is of note the patient is still having frequent isolated PVCs on her twelve-lead EKG today. To make certain that she is not having significant cardiac arrhythmias, we will plan on eventually scheduling a 24-hour Holter monitor. Patient is noted to have more significant ventricular arrhythmias were very frequent PVCs, we  should consider further evaluation including a 2D echocardiogram and/or stress test.    I did advise the patient that she should continue to minimize her caffeine intake as this may have been aggravating factor in her symptoms (the patient states she was drinking a lot of tea throughout the day). She will also continue her efforts at staying well-hydrated while on.    Further recommendations will be made pending the results of her Holter monitor      The following portions of the patient's history were reviewed and updated as appropriate: allergies, current medications, past family history, past medical history, past social history, past surgical history and problem list.    Review of Systems   Constitutional: Negative for activity change, appetite change and unexpected weight change.   HENT: Negative for nosebleeds and trouble swallowing.    Eyes: Negative for pain and visual disturbance.   Respiratory: Negative for chest tightness, shortness of breath and wheezing.    Cardiovascular: Negative for chest pain and palpitations.   Gastrointestinal: Negative for abdominal pain and blood in stool.   Endocrine: Negative.    Genitourinary: Negative for difficulty urinating and hematuria.   Musculoskeletal: Negative for joint swelling.   Skin: Negative for color change and rash.   Allergic/Immunologic: Negative.    Neurological: Negative for syncope and speech difficulty.   Hematological: Negative for adenopathy.   Psychiatric/Behavioral: Negative for agitation and confusion.   All other systems reviewed and are negative.      Objective   Physical Exam  Vitals and nursing note reviewed.   Constitutional:       General: She is not in acute distress.     Appearance: She is well-developed. She is not ill-appearing or toxic-appearing.   HENT:      Head: Normocephalic.      Right Ear: External ear normal.      Left Ear: External ear normal.      Nose: Nose normal.      Mouth/Throat:      Pharynx: Oropharynx is clear.   Eyes:       General: No scleral icterus.     Conjunctiva/sclera: Conjunctivae normal.      Pupils: Pupils are equal, round, and reactive to light.   Neck:      Thyroid: No thyromegaly.   Cardiovascular:      Rate and Rhythm: Normal rate and regular rhythm.      Heart sounds: Normal heart sounds. No murmur heard.      Pulmonary:      Effort: Pulmonary effort is normal. No respiratory distress.      Breath sounds: Normal breath sounds.   Musculoskeletal:         General: No deformity. Normal range of motion.      Cervical back: Normal range of motion and neck supple.   Skin:     General: Skin is warm and dry.      Findings: No rash.   Neurological:      General: No focal deficit present.      Mental Status: She is alert and oriented to person, place, and time. Mental status is at baseline.   Psychiatric:         Mood and Affect: Mood normal.         Behavior: Behavior normal.         Thought Content: Thought content normal.         Judgment: Judgment normal.         Assessment/Plan   Diagnoses and all orders for this visit:    1. Laboratory examination ordered as part of a routine general medical examination (Primary)    2. Hepatitis B carrier (HCC)      Stop caffeine f/u cardio  Still suggest seeing hematology for hx DVT  Still suggest seeing GI for chronic Hep B  Labs--lipids, glucose  F/u cardio for palpitations  Watching bp--get weight down  Low glycemic index diet  Exercise 30 minutes most days of the week  Make sure you get results on any labs or tests we ordered today  We discussed medications and how to take them as prescribed  Sleep 6-8 hours each night if possible  LDL goal <100  HDL goal >60  Triglyceride goal <150  BP goal =<130/80  Fasting glucose <100         Answers for HPI/ROS submitted by the patient on 10/13/2021  What is the primary reason for your visit?: Physical

## 2021-10-24 LAB
CHOLEST SERPL-MCNC: 192 MG/DL (ref 100–199)
GLUCOSE SERPL-MCNC: 94 MG/DL (ref 65–99)
HDLC SERPL-MCNC: 71 MG/DL
LDLC SERPL CALC-MCNC: 108 MG/DL (ref 0–99)
TRIGL SERPL-MCNC: 69 MG/DL (ref 0–149)
VLDLC SERPL CALC-MCNC: 13 MG/DL (ref 5–40)

## 2021-10-27 ENCOUNTER — TELEPHONE (OUTPATIENT)
Dept: OBSTETRICS AND GYNECOLOGY | Facility: CLINIC | Age: 51
End: 2021-10-27

## 2021-10-27 NOTE — TELEPHONE ENCOUNTER
Patient was seen in the office for annual exam 10/12/2021.  She was calling today for pap results.  This patient had a pap in October 2020 that was negative/normal.  I have advised the patient that the current pap screening guidelines state that the pap be performed every 3 years unless there is an indication to do it more often.  She did not have a pap 10/12/2021 and she voiced understanding of the guidelines.  She will seek care in the future in [place].

## 2021-10-27 NOTE — TELEPHONE ENCOUNTER
DR POLANCO PT STATED SHE HAD ANNUAL EXAM ON 10/12/2021 AND SHE HAS NOT HEARD BACK REGARDING HER RESULTS. PLEASE CALL.

## 2021-11-07 ENCOUNTER — HOSPITAL ENCOUNTER (OUTPATIENT)
Dept: MAMMOGRAPHY | Facility: HOSPITAL | Age: 51
End: 2021-11-07

## 2021-12-05 ENCOUNTER — HOSPITAL ENCOUNTER (OUTPATIENT)
Dept: MAMMOGRAPHY | Facility: HOSPITAL | Age: 51
Discharge: HOME | End: 2021-12-05
Admitting: OBSTETRICS & GYNECOLOGY

## 2021-12-05 DIAGNOSIS — Z12.31 SCREENING MAMMOGRAM, ENCOUNTER FOR: ICD-10-CM

## 2021-12-05 PROCEDURE — 77063 BREAST TOMOSYNTHESIS BI: CPT

## 2021-12-05 PROCEDURE — 77067 SCR MAMMO BI INCL CAD: CPT

## 2022-01-19 ENCOUNTER — OFFICE VISIT (OUTPATIENT)
Dept: FAMILY MEDICINE CLINIC | Facility: CLINIC | Age: 52
End: 2022-01-19

## 2022-01-19 VITALS
HEIGHT: 62 IN | WEIGHT: 139 LBS | SYSTOLIC BLOOD PRESSURE: 120 MMHG | HEART RATE: 82 BPM | DIASTOLIC BLOOD PRESSURE: 80 MMHG | TEMPERATURE: 97.5 F | RESPIRATION RATE: 16 BRPM | OXYGEN SATURATION: 98 % | BODY MASS INDEX: 25.58 KG/M2

## 2022-01-19 DIAGNOSIS — Z86.19 HISTORY OF HEPATITIS B: ICD-10-CM

## 2022-01-19 DIAGNOSIS — E78.2 HYPERLIPIDEMIA, MIXED: ICD-10-CM

## 2022-01-19 DIAGNOSIS — R07.89 COSTOCHONDRAL CHEST PAIN: Primary | ICD-10-CM

## 2022-01-19 PROCEDURE — 99213 OFFICE O/P EST LOW 20 MIN: CPT | Performed by: PHYSICIAN ASSISTANT

## 2022-01-19 NOTE — PROGRESS NOTES
"Subjective   Brittaney Augustin is a 52 y.o. female.     History of Present Illness   Brittaney Augustin 52 y.o. female /80   Pulse 82   Temp 97.5 °F (36.4 °C)   Resp 16   Ht 157.5 cm (62.01\")   Wt 63 kg (139 lb)   SpO2 98%   BMI 25.42 kg/m²  who presents today for chest wall pain.  she has a history of   Patient Active Problem List   Diagnosis   • Vertigo   • Atypical chest pain   • Chronic hepatitis B virus infection (HCC)   • Deep vein thrombosis (DVT) (HCC)   • Elevated blood pressure reading   .    Was having some neck pain and chest soreness;  Dec 13 to 1-12-22----lasted few weeks; was new.  She did take Advil and now stopped and has been resolved for a week. Left side mid sternal.  Neck is resolved;  This is more ache;  It was intermittent. No pain with ROM or touch; no recent infection. Current on mammo.  Has seen cardio--- saw DR Davidson Oct.   No fam hx CAD    Getting weigh down and bp improved  Wants f/u ln glucose, liver, lipids  X-Ray  Interpretation report in house X-rays that I personally viewed    Relevant Clinical Issues/Diagnoses/Indications: Chest wall pain        Clinical Findings: Normal heart size, lungs expanded bilateral no mass, no infiltrate, no bony abnormalities.          Comparative Data: None          Date of Previous X-ray:    Change on current X-ray:      The following portions of the patient's history were reviewed and updated as appropriate: allergies, current medications, past family history, past medical history, past social history, past surgical history and problem list.    Review of Systems   Constitutional: Negative for fever.   HENT: Negative for nosebleeds and trouble swallowing.    Eyes: Negative for visual disturbance.   Respiratory: Negative for choking and stridor.    Cardiovascular: Positive for chest pain.   Gastrointestinal: Negative for blood in stool.   Endocrine: Negative for polydipsia.   Genitourinary: Negative for genital sores and hematuria. "   Musculoskeletal: Negative for joint swelling.   Skin: Negative for color change and rash.   Allergic/Immunologic: Negative for immunocompromised state.   Neurological: Negative for seizures, facial asymmetry and speech difficulty.   Hematological: Negative for adenopathy.   Psychiatric/Behavioral: Negative for behavioral problems, self-injury and suicidal ideas.       Objective   Physical Exam  Vitals and nursing note reviewed.   Constitutional:       General: She is not in acute distress.     Appearance: She is well-developed. She is not ill-appearing or toxic-appearing.   HENT:      Head: Normocephalic.      Right Ear: External ear normal.      Left Ear: External ear normal.      Nose: Nose normal.      Mouth/Throat:      Pharynx: Oropharynx is clear.   Eyes:      General: No scleral icterus.     Conjunctiva/sclera: Conjunctivae normal.      Pupils: Pupils are equal, round, and reactive to light.   Neck:      Thyroid: No thyromegaly.   Cardiovascular:      Rate and Rhythm: Normal rate and regular rhythm.      Pulses: Normal pulses.      Heart sounds: Normal heart sounds. No murmur heard.      Pulmonary:      Effort: Pulmonary effort is normal. No respiratory distress.      Breath sounds: Normal breath sounds.   Musculoskeletal:         General: No tenderness or deformity. Normal range of motion.      Cervical back: Normal range of motion and neck supple.      Comments: Mid sternal ---LSB;  Not tender and no mass; not red   Skin:     General: Skin is warm and dry.      Coloration: Skin is not jaundiced.      Findings: No rash.   Neurological:      General: No focal deficit present.      Mental Status: She is alert and oriented to person, place, and time. Mental status is at baseline.   Psychiatric:         Behavior: Behavior normal.         Thought Content: Thought content normal.         Judgment: Judgment normal.         Assessment/Plan   Diagnoses and all orders for this visit:    1. Costochondral chest pain  (Primary)  -     Comprehensive metabolic panel  -     Lipid panel  -     Hepatitis B DNA, Quantitative, PCR    2. History of hepatitis B  -     Comprehensive metabolic panel  -     Lipid panel  -     Hepatitis B DNA, Quantitative, PCR    3. Hyperlipidemia, mixed  -     Comprehensive metabolic panel  -     Lipid panel  -     Hepatitis B DNA, Quantitative, PCR      Will get CXR to image costochondral junction --now resolved  F/u glucose, liver, lipids    Watch bp       Answers for HPI/ROS submitted by the patient on 1/13/2022  Please describe your symptoms.: Intermittent pain on left side, center, top. Feeks like breast tissue aching/dull pain. Sonetines it almost throbs but no swelling or heart issue symptoms. Had to stop a yaen midway once, felt like i pulled a muscle  during yawn.but no oain after that., , Started about a week after mammogram in december. Has happened iff snd in since then.  Have you had these symptoms before?: No  How long have you been having these symptoms?: Greater than 2 weeks  Please list any medications you are currently taking for this condition.: Ibuprofen  Please describe any probable cause for these symptoms. : Not sure. No injuries to chest, heart rate and oxygen kevrns seem normak, no shortness of breath. Not sure if anxiety from [facility] ER visit and PVC duagnosus adding to anxiety. Have stopped caffeine since ER visit.  What is the primary reason for your visit?: Other

## 2022-01-21 ENCOUNTER — TELEPHONE (OUTPATIENT)
Dept: FAMILY MEDICINE CLINIC | Facility: CLINIC | Age: 52
End: 2022-01-21

## 2022-01-21 LAB
ALBUMIN SERPL-MCNC: NORMAL G/DL
ALP SERPL-CCNC: NORMAL U/L
ALT SERPL-CCNC: NORMAL U/L
AST SERPL-CCNC: NORMAL U/L
BILIRUB SERPL-MCNC: NORMAL MG/DL
BUN SERPL-MCNC: NORMAL MG/DL
CALCIUM SERPL-MCNC: NORMAL MG/DL
CHLORIDE SERPL-SCNC: NORMAL MMOL/L
CHOLEST SERPL-MCNC: NORMAL MG/DL
CO2 SERPL-SCNC: NORMAL MMOL/L
CREAT SERPL-MCNC: NORMAL MG/DL
GLUCOSE SERPL-MCNC: NORMAL MG/DL
HBV DNA SERPL NAA+PROBE-ACNC: NORMAL IU/ML
HBV DNA SERPL NAA+PROBE-LOG IU: 4.78 LOG10 IU/ML
HDLC SERPL-MCNC: NORMAL MG/DL
POTASSIUM SERPL-SCNC: NORMAL MMOL/L
PROT SERPL-MCNC: NORMAL G/DL
REF LAB TEST REF RANGE: NORMAL
SODIUM SERPL-SCNC: NORMAL MMOL/L
SPECIMEN STATUS: NORMAL
TRIGL SERPL-MCNC: NORMAL MG/DL
VLDLC SERPL CALC-MCNC: NORMAL MG/DL

## 2022-01-21 NOTE — TELEPHONE ENCOUNTER
If she is going to do labs here at the office then she needs an appointment.  [facility] if she can walk-in.  Would still advise that she be 4 to 5 hours since she ate last.    I assume I do not have to make another order due to the labs error?

## 2022-01-21 NOTE — TELEPHONE ENCOUNTER
Caller: Brittaney Augustin    Relationship: Self    Best call back number: [phone]    What was the call regarding: PATIENT CALLED REGARDING HER RECENT LAB WORK. PATIENT HAD LAB WORK DONE ON 01/19/2022. PATIENT WAS INFORMED THAT HER LABS WERE REJECTED BECAUSE THEY HAD RECEIVED IT FROZEN. PATIENT HAS TO REDO THOSE LABS. PLEASE ADVISE IF PATIENT CAN GO AHEAD AND WALK IN OR IF SHE NEEDS AN APPOINTMENT.     Do you require a callback: YES

## 2022-08-23 ENCOUNTER — TELEMEDICINE (OUTPATIENT)
Dept: FAMILY MEDICINE CLINIC | Facility: CLINIC | Age: 52
End: 2022-08-23

## 2022-08-23 DIAGNOSIS — U07.1 COVID: Primary | ICD-10-CM

## 2022-08-23 DIAGNOSIS — J06.9 ACUTE URI: ICD-10-CM

## 2022-08-23 PROCEDURE — 99213 OFFICE O/P EST LOW 20 MIN: CPT | Performed by: PHYSICIAN ASSISTANT

## 2022-08-23 RX ORDER — DEXTROMETHORPHAN HYDROBROMIDE AND PROMETHAZINE HYDROCHLORIDE 15; 6.25 MG/5ML; MG/5ML
5 SYRUP ORAL 4 TIMES DAILY PRN
Qty: 180 ML | Refills: 0 | Status: SHIPPED | OUTPATIENT
Start: 2022-08-23

## 2022-08-23 NOTE — PATIENT INSTRUCTIONS
How to Quarantine at Home  Information for Patients and Families    These instructions are for people with confirmed or suspected COVID-19 who do not need to be hospitalized and those with confirmed COVID-19 who were hospitalized and discharged to care for themselves at home.    If you were tested through the Health Department  The Health Department will monitor your wellbeing.  If it is determined that you do not need to be hospitalized and can be isolated at home, you will be monitored by staff from your local or state health department.     If you were tested through a Commercial Lab  You will need to monitor yourself and report changes in your symptoms to your doctor.  See the section below called Monitor Your Symptoms.    Follow these steps until a healthcare provider or local or state health department says you can return to your normal activities.    Stay home except to get medical care  Restrict activities outside your home, except for getting medical care.   Do not go to work, school, or public areas.   Avoid using public transportation, ride-sharing, or taxis.    Separate yourself from other people and animals in your home  People  As much as possible, you should stay in a specific room and away from other people in your home. Also, you should use a separate bathroom, if available.    Animals  You should restrict contact with pets and other animals while you are sick with COVID-19, just like you would around other people. When possible, have another member of your household care for your animals while you are sick. If you are sick with COVID-19, avoid contact with your pet, including petting, snuggling, being kissed or licked, and sharing food. If you must care for your pet or be around animals while you are sick, wash your hands before and after you interact with pets and wear a facemask. See COVID-19 and Animals for more information.    Call ahead before visiting your doctor  If you have a medical  appointment, call the healthcare provider and tell them that you have or may have COVID-19. This information will help the healthcare provider’s office take steps to keep other people from getting infected or exposed.    Wear a facemask  You should wear a facemask when you are around other people (e.g., sharing a room or vehicle) or pets and before you enter a healthcare provider’s office.     If you are not able to wear a facemask (for example, because it causes trouble breathing), then people who live with you should not stay in the same room with you, or they should wear a facemask if they enter your room.    Cover your coughs and sneezes  Cover your mouth and nose with a tissue when you cough or sneeze.   Throw used tissues in a lined trash can.   Immediately wash your hands with soap and water for at least 20 seconds or, if soap and water are not available, clean your hands with an alcohol-based hand  that contains at least 60% alcohol.    Clean your hands often  Wash your hands often with soap and water for at least 20 seconds, especially after blowing your nose, coughing, or sneezing; going to the bathroom; and before eating or preparing food.     If soap and water are not readily available, use an alcohol-based hand  with at least 60% alcohol, covering all surfaces of your hands and rubbing them together until they feel dry.    Soap and water are the best option if hands are visibly dirty. Avoid touching your eyes, nose, and mouth with unwashed hands.    Avoid sharing personal household items  You should not share dishes, drinking glasses, cups, eating utensils, towels, or bedding with other people or pets in your home.   After using these items, they should be washed thoroughly with soap and water.    Clean all “high-touch” surfaces everyday  High touch surfaces include counters, tabletops, doorknobs, bathroom fixtures, toilets, phones, keyboards, tablets, and bedside tables.   Also, clean  any surfaces that may have blood, stool, or body fluids on them.   Use a household cleaning spray or wipe, according to the label instructions. Labels contain instructions for safe and effective use of the cleaning product, including precautions you should take when applying the product, such as wearing gloves and making sure you have good ventilation during use of the product.    Monitor your symptoms  Seek prompt medical attention if your illness is worsening (e.g., difficulty breathing).   Before seeking care, call your healthcare provider and tell them that you have, or are being evaluated for, COVID-19.   Put on a facemask before you enter the facility.     These steps will help the healthcare provider’s office to keep other people in the office or waiting room from getting infected or exposed.   Persons who are placed under active monitoring or facilitated self-monitoring should follow instructions provided by their local health department or occupational health professionals, as appropriate.  If you have a medical emergency and need to call 911, notify the dispatch personnel that you have, or are being evaluated for COVID-19. If possible, put on a facemask before emergency medical services arrive.    Discontinuing home isolation  Patients with confirmed COVID-19 should remain under home isolation precautions until the risk of secondary transmission to others is thought to be low. The decision to discontinue home isolation precautions should be made on a case-by-case basis, in consultation with healthcare providers and state and local health departments.    The below content are for household members, intimate partners, and caregivers of a patient with symptomatic laboratory-confirmed COVID-19 or a patient under investigation:    Household members, intimate partners, and caregivers may have close contact with a person with symptomatic, laboratory-confirmed COVID-19 or a person under investigation.     Close  contacts should monitor their health; they should call their healthcare provider right away if they develop symptoms suggestive of COVID-19 (e.g., fever, cough, shortness of breath)     Close contacts should also follow these recommendations:  Make sure that you understand and can help the patient follow their healthcare provider’s instructions for medication(s) and care. You should help the patient with basic needs in the home and provide support for getting groceries, prescriptions, and other personal needs.  Monitor the patient’s symptoms. If the patient is getting sicker, call his or her healthcare provider and tell them that the patient has laboratory-confirmed COVID-19. This will help the healthcare provider’s office take steps to keep other people in the office or waiting room from getting infected. Ask the healthcare provider to call the local or [facility] health department for additional guidance. If the patient has a medical emergency and you need to call 911, notify the dispatch personnel that the patient has, or is being evaluated for COVID-19.  Household members should stay in another room or be  from the patient as much as possible. Household members should use a separate bedroom and bathroom, if available.  Prohibit visitors who do not have an essential need to be in the home.  Household members should care for any pets in the home. Do not handle pets or other animals while sick.  For more information, see COVID-19 and Animals.  Make sure that shared spaces in the home have good air flow, such as by an air conditioner or an opened window, weather permitting.  Perform hand hygiene frequently. Wash your hands often with soap and water for at least 20 seconds or use an alcohol-based hand  that contains 60 to 95% alcohol, covering all surfaces of your hands and rubbing them together until they feel dry. Soap and water should be used preferentially if hands are visibly dirty.  Avoid touching  your eyes, nose, and mouth with unwashed hands.  The patient should wear a facemask when you are around other people. If the patient is not able to wear a facemask (for example, because it causes trouble breathing), you, as the caregiver, should wear a mask when you are in the same room as the patient.  Wear a disposable facemask and gloves when you touch or have contact with the patient’s blood, stool, or body fluids, such as saliva, sputum, nasal mucus, vomit, or urine.   Throw out disposable facemasks and gloves after using them. Do not reuse.  When removing personal protective equipment, first remove and dispose of gloves. Then, immediately clean your hands with soap and water or alcohol-based hand . Next, remove and dispose of facemask, and immediately clean your hands again with soap and water or alcohol-based hand .  Avoid sharing household items with the patient. You should not share dishes, drinking glasses, cups, eating utensils, towels, bedding, or other items. After the patient uses these items, you should wash them thoroughly (see below “Wash laundry thoroughly”).  Clean all “high-touch” surfaces, such as counters, tabletops, doorknobs, bathroom fixtures, toilets, phones, keyboards, tablets, and bedside tables, every day. Also, clean any surfaces that may have blood, stool, or body fluids on them.   Use a household cleaning spray or wipe, according to the label instructions. Labels contain instructions for safe and effective use of the cleaning product including precautions you should take when applying the product, such as wearing gloves and making sure you have good ventilation during use of the product.  Wash laundry thoroughly.   Immediately remove and wash clothes or bedding that have blood, stool, or body fluids on them.  Wear disposable gloves while handling soiled items and keep soiled items away from your body. Clean your hands (with soap and water or an alcohol-based hand  ) immediately after removing your gloves.  Read and follow directions on labels of laundry or clothing items and detergent. In general, using a normal laundry detergent according to washing machine instructions and dry thoroughly using the warmest temperatures recommended on the clothing label.  Place all used disposable gloves, facemasks, and other contaminated items in a lined container before disposing of them with other household waste. Clean your hands (with soap and water or an alcohol-based hand ) immediately after handling these items. Soap and water should be used preferentially if hands are visibly dirty.  Discuss any additional questions with your state or local health department or healthcare provider.    Adapted from information provided by the Centers for Disease Control and Prevention.  For more information, visit https://www.cdc.gov/coronavirus/2019-ncov/hcp/guidance-prevent-spread.html

## 2022-08-23 NOTE — PROGRESS NOTES
Subjective   Brittaney Augustin is a 52 y.o. female.   You have chosen to receive care through a telehealth visit.  Do you consent to use a video/audio connection for your medical care today? Yes  BMI Readings from Last 1 Encounters:   01/19/22 25.42 kg/m²       History of Present Illness     Brittaney Augustin 52 y.o. female who presents for evaluation of upper respiratory congestion, cough. Symptoms include sore throat, nasal blockage, headache and sneezing.  Onset of symptoms was 2 days ago, gradually improving since that time. Patient denies shortness of breath, wheezing, fever.   Evaluation to date: none Treatment to date:  OTC oral decongestants.     Body aches, fever, -----still is sore throat  No diarrhea  4 vaccines  No fever today; no more body aches today.   The following portions of the patient's history were reviewed and updated as appropriate: allergies, current medications, past family history, past medical history, past social history, past surgical history and problem list.    Review of Systems   Constitutional: Negative for activity change, appetite change and unexpected weight change.   HENT: Positive for congestion, postnasal drip and sneezing. Negative for nosebleeds and trouble swallowing.    Eyes: Negative for pain and visual disturbance.   Respiratory: Positive for cough. Negative for chest tightness, shortness of breath and wheezing.    Cardiovascular: Negative for chest pain and palpitations.   Gastrointestinal: Negative for abdominal pain and blood in stool.   Endocrine: Negative.    Genitourinary: Negative for difficulty urinating and hematuria.   Musculoskeletal: Negative for joint swelling.   Skin: Negative for color change and rash.   Allergic/Immunologic: Negative.    Neurological: Negative for syncope and speech difficulty.   Hematological: Negative for adenopathy.   Psychiatric/Behavioral: Negative for agitation and confusion.   All other systems reviewed and are  negative.      Objective   Physical Exam  Constitutional:       General: She is not in acute distress.     Appearance: She is well-developed. She is not diaphoretic.   HENT:      Head: Normocephalic and atraumatic.   Eyes:      Conjunctiva/sclera: Conjunctivae normal.   Pulmonary:      Effort: Pulmonary effort is normal. No respiratory distress.   Musculoskeletal:         General: Normal range of motion.      Cervical back: Normal range of motion.   Skin:     General: Skin is dry.   Neurological:      Mental Status: She is alert and oriented to person, place, and time.      Coordination: Coordination normal.   Psychiatric:         Behavior: Behavior normal.         Thought Content: Thought content normal.         Judgment: Judgment normal.         Assessment & Plan   Diagnoses and all orders for this visit:    1. COVID (Primary)    2. Acute URI    Other orders  -     promethazine-dextromethorphan (PROMETHAZINE-DM) 6.25-15 MG/5ML syrup; Take 5 mL by mouth 4 (Four) Times a Day As Needed for Cough.  Dispense: 180 mL; Refill: 0      Can take Vit D 5,000 IU daily, vit C 1000 mg twice daily, Zinc 50 mg twice daily -----all for 10 days    Put Promethazine DM on file if need cough  Patient declines antibiotic and prednisone we did talk about Paxil for but she does not have any immunocompromising risk factors to qualify  For the popup menu to write it  Time 9 miin

## 2023-12-18 ENCOUNTER — TELEPHONE (OUTPATIENT)
Dept: FAMILY MEDICINE CLINIC | Facility: CLINIC | Age: 53
End: 2023-12-18
Payer: COMMERCIAL

## 2023-12-18 NOTE — TELEPHONE ENCOUNTER
sHe obviously should avoid edibles.... Especially with over-the-counter medication  If she is still having heart rate below 50 do encourage her to go to the ER... If heart rate is back above 50 and staying under 100 she still needs to make an appointment to discuss

## 2023-12-18 NOTE — TELEPHONE ENCOUNTER
Pt advised of PCP dictation. Pt HR now 67-83 without palpitations, CP, SOA. Pt has alexis appt with Cardio and PCP to discuss further.

## 2023-12-18 NOTE — TELEPHONE ENCOUNTER
Caller: Brittaney Augustin    Relationship to patient: Self    Best call back number: [phone]    Chief complaint: HEART PALPITATIONS ON SATURDAY, 12/16/2023, HIGH BLOOD PRESSURE, AND LABS.     Type of visit: OFFICE VISIT    Requested date: 12/22 OR 12/29    If rescheduling, when is the original appointment: 01/10/2024    Additional notes: THE PATIENT DID STATE THAT SHE WILL GO TO THE E.R. IF SHE NOTICES HER HEART PALPITATIONS RETURN. THE PATIENT STATES THAT HER HEART RATE WAS GOING FROM THE 80-90 RANGE TO DOWN TO 40 FOR A FEW HOURS. THE PATIENT HAS HAD ISSUES WITH HEART RATE IN THE PAST. THE PATIENT ALSO STATES THAT SHE HAD A DELTA 8 EDIBLE EARLIER LAST WEEK ALONG WITH MUCINEX THROUGHOUT THE WEEK. THE PATIENT IS NOT SURE IF THESE TWO SUBSTANCES MIXED TOGETHER WOULD CAUSE THIS ISSUE.

## 2023-12-20 ENCOUNTER — TELEPHONE (OUTPATIENT)
Dept: FAMILY MEDICINE CLINIC | Facility: CLINIC | Age: 53
End: 2023-12-20
Payer: COMMERCIAL

## 2023-12-20 DIAGNOSIS — R79.89 ABNORMAL THYROID BLOOD TEST: Primary | ICD-10-CM

## 2023-12-20 NOTE — TELEPHONE ENCOUNTER
Caller: Brittaney Augustin    Relationship: Self    Best call back number: [phone]     What is the best time to reach you: ANYTIME    Who are you requesting to speak with (clinical staff, provider,  specific staff member): CLINICAL    What was the call regarding: PATIENT STATED SHE HAS HAD LABS PREFORMED THROUGH [facility].    PATIENT IS REQUESTING AGAPITO FLORES REVIEW THESE LABS, AND INFORM HER IF THERE ARE ANY OTHER LABS SHE SHOULD PREFORM,ED PRIOR TO HER VISIT ON 01-

## 2023-12-27 LAB
T3FREE SERPL-MCNC: 2.9 PG/ML (ref 2–4.4)
T4 FREE SERPL-MCNC: 1.67 NG/DL (ref 0.82–1.77)
THYROGLOB AB SERPL-ACNC: <1 IU/ML (ref 0–0.9)
THYROPEROXIDASE AB SERPL-ACNC: <9 IU/ML (ref 0–34)
TSH SERPL DL<=0.005 MIU/L-ACNC: 1.66 UIU/ML (ref 0.45–4.5)

## 2024-01-08 ENCOUNTER — TELEPHONE (OUTPATIENT)
Dept: FAMILY MEDICINE CLINIC | Facility: CLINIC | Age: 54
End: 2024-01-08
Payer: COMMERCIAL

## 2024-01-08 DIAGNOSIS — Z86.19 HISTORY OF HEPATITIS B: Primary | ICD-10-CM

## 2024-01-08 DIAGNOSIS — E78.2 HYPERLIPIDEMIA, MIXED: ICD-10-CM

## 2024-01-08 DIAGNOSIS — E55.9 VITAMIN D DEFICIENCY: ICD-10-CM

## 2024-01-08 DIAGNOSIS — Z00.00 LABORATORY EXAMINATION ORDERED AS PART OF A ROUTINE GENERAL MEDICAL EXAMINATION: ICD-10-CM

## 2024-01-08 NOTE — TELEPHONE ENCOUNTER
Caller: Brittaney Augustin    Relationship: Self    Best call back number: [phone]    What orders are you requesting (i.e. lab or imaging): LIVER FUNCTION PANEL, LIPID PANEL, METABOLIC PANEL, VITAMIN D, AND QUANTITATIVE HBV DNA     In what timeframe would the patient need to come in: BEFORE APPOINTMENT ON 01/10/24    Where will you receive your lab/imaging services: IN OFFICE    Additional notes: PLEASE CALL.

## 2024-01-10 ENCOUNTER — OFFICE VISIT (OUTPATIENT)
Dept: FAMILY MEDICINE CLINIC | Facility: CLINIC | Age: 54
End: 2024-01-10
Payer: COMMERCIAL

## 2024-01-10 VITALS
DIASTOLIC BLOOD PRESSURE: 70 MMHG | TEMPERATURE: 97.4 F | SYSTOLIC BLOOD PRESSURE: 118 MMHG | BODY MASS INDEX: 26.5 KG/M2 | HEART RATE: 88 BPM | WEIGHT: 144 LBS | HEIGHT: 62 IN | OXYGEN SATURATION: 98 % | RESPIRATION RATE: 16 BRPM

## 2024-01-10 DIAGNOSIS — E55.9 VITAMIN D DEFICIENCY: ICD-10-CM

## 2024-01-10 DIAGNOSIS — F41.8 SITUATIONAL ANXIETY: ICD-10-CM

## 2024-01-10 DIAGNOSIS — R79.89 ABNORMAL THYROID BLOOD TEST: Primary | ICD-10-CM

## 2024-01-10 PROCEDURE — 99213 OFFICE O/P EST LOW 20 MIN: CPT | Performed by: PHYSICIAN ASSISTANT

## 2024-01-10 RX ORDER — HYDROXYZINE HYDROCHLORIDE 10 MG/1
TABLET, FILM COATED ORAL
Qty: 100 TABLET | Refills: 5 | Status: SHIPPED | OUTPATIENT
Start: 2024-01-10

## 2024-01-10 RX ORDER — DIPHENOXYLATE HYDROCHLORIDE AND ATROPINE SULFATE 2.5; .025 MG/1; MG/1
TABLET ORAL AS NEEDED
COMMUNITY

## 2024-01-10 NOTE — PROGRESS NOTES
"Subjective   Brittaney Augustin is a 53 y.o. female.     History of Present Illness      Since the last visit, she has overall felt well.  She has Vitamin D deficiency and will update labs for continued management.  she has been compliant with current medications have reviewed them.  The patient denies medication side effects.  Will refill medications. /78   Pulse 88   Temp 97.4 °F (36.3 °C)   Resp 16   Ht 157.5 cm (62\")   Wt 65.3 kg (144 lb)   SpO2 98%   BMI 26.34 kg/m²   No fam hx CAD  Lost weight---no more snoring  PVC at rest  Need some exercise  Results for orders placed or performed in visit on 12/20/23   T4, Free    Specimen: Blood   Result Value Ref Range    Free T4 1.67 0.82 - 1.77 ng/dL   T3, Free    Specimen: Blood   Result Value Ref Range    T3, Free 2.9 2.0 - 4.4 pg/mL   TSH    Specimen: Blood   Result Value Ref Range    TSH 1.660 0.450 - 4.500 uIU/mL   Thyroid Antibodies    Specimen: Blood   Result Value Ref Range    Thyroid Peroxidase Antibody <9 0 - 34 IU/mL    Thyroglobulin Ab <1.0 0.0 - 0.9 IU/mL     I did f/u labs for thyroid 12-26-23--normal-----cardio ordered thyroid labs d/t palpitations work up---free T4 was slightly elevated--I repeated labs and got ab    Last saw me 2 yrs ago  GYN was 2020  She has Hep B ---born with it--Mom   Saw cardio --last visit 1ECG tracing performed and reviewed today shows sinus rhythm, PAC and PVC.    Assessment:   1. Palpitations   -symptomatic  -6.8% burden on Holter 2/2023   2. Symptomatic PVCs   3. Bradycardia   -HR on iWatch sometimes low  -questionable accuracy due to PVCs, PACs 2-22-23  DR Stuart smallwood---did 24 Holter  2-8-23  Conclusion: Abnormal 24-hour Holter monitor.  The study demonstrated sinus rhythm with frequent isolated ventricular ectopics with rare ventricular couplets, a single ventricular triplet and a 4 beat run of nonsustained ventricular tachycardia vs a run   of SVT with intermittent aberrant conduction as noted.  There were " rare isolated supraventricular topics with rare atrial couplets and 2 short runs of SVT as noted.  There were no symptoms noted in the patient diary.   BP at cardio 130/74  The following portions of the patient's history were reviewed and updated as appropriate: allergies, current medications, past family history, past medical history, past social history, past surgical history, and problem list.    Review of Systems   Constitutional:  Negative for diaphoresis.   HENT:  Negative for nosebleeds and trouble swallowing.    Eyes:  Negative for blurred vision and visual disturbance.   Respiratory:  Negative for choking.    Cardiovascular:  Positive for palpitations.   Gastrointestinal:  Negative for blood in stool.   Allergic/Immunologic: Negative for immunocompromised state.   Neurological:  Negative for facial asymmetry and speech difficulty.   Psychiatric/Behavioral:  Negative for self-injury and suicidal ideas. The patient is nervous/anxious.        Objective   Physical Exam  Vitals and nursing note reviewed.   Constitutional:       General: She is not in acute distress.     Appearance: She is well-developed. She is not ill-appearing or toxic-appearing.   HENT:      Head: Normocephalic.      Right Ear: External ear normal.      Left Ear: External ear normal.      Nose: Nose normal.      Mouth/Throat:      Pharynx: Oropharynx is clear.   Eyes:      General: No scleral icterus.     Conjunctiva/sclera: Conjunctivae normal.      Pupils: Pupils are equal, round, and reactive to light.   Neck:      Thyroid: No thyromegaly.   Cardiovascular:      Rate and Rhythm: Normal rate and regular rhythm.      Heart sounds: Normal heart sounds. No murmur heard.  Pulmonary:      Effort: Pulmonary effort is normal. No respiratory distress.      Breath sounds: Normal breath sounds.   Musculoskeletal:         General: No deformity. Normal range of motion.      Cervical back: Normal range of motion and neck supple.   Skin:     General:  Skin is warm and dry.      Findings: No rash.   Neurological:      General: No focal deficit present.      Mental Status: She is alert and oriented to person, place, and time. Mental status is at baseline.   Psychiatric:         Mood and Affect: Mood normal.         Behavior: Behavior normal.         Thought Content: Thought content normal.         Judgment: Judgment normal.           Assessment & Plan   Diagnoses and all orders for this visit:    1. Abnormal thyroid blood test (Primary)  -     T4, Free; Future  -     TSH; Future  -     T3, Free; Future    2. Vitamin D deficiency        Watching free T4----check again June---  Denies mammo screen\  Seeing cardio for PVC-----to have echo and stress test  I will have her take          Answers submitted by the patient for this visit:  Other (Submitted on 1/3/2024)  Please describe your symptoms.: Annual exam with bloodwork, follow up with cardio dic visits for palpitations  Have you had these symptoms before?: Yes  How long have you been having these symptoms?: Greater than 2 weeks  Please list any medications you are currently taking for this condition.: Multi vitamin and probiotic, not daily  Please describe any probable cause for these symptoms. : FREE T4 was elevated. Had 1 20mg edible 4 days before. Edible felt awful, created anxiety and possibly heart palpitations. Never again. Overweight, possible bodily inflsmnation from higg carb diet. Have since cut back kn sugars, lost 6-8 pounds.  Primary Reason for Visit (Submitted on 1/3/2024)  What is the primary reason for your visit?: Other

## 2024-01-12 LAB
25(OH)D3+25(OH)D2 SERPL-MCNC: 21.3 NG/ML (ref 30–100)
ALBUMIN SERPL-MCNC: 4.7 G/DL (ref 3.8–4.9)
ALBUMIN/GLOB SERPL: 1.6 {RATIO} (ref 1.2–2.2)
ALP SERPL-CCNC: 64 IU/L (ref 44–121)
ALT SERPL-CCNC: 22 IU/L (ref 0–32)
AST SERPL-CCNC: 24 IU/L (ref 0–40)
BASOPHILS # BLD AUTO: 0 X10E3/UL (ref 0–0.2)
BASOPHILS NFR BLD AUTO: 1 %
BILIRUB SERPL-MCNC: 0.6 MG/DL (ref 0–1.2)
BUN SERPL-MCNC: 9 MG/DL (ref 6–24)
BUN/CREAT SERPL: 13 (ref 9–23)
CALCIUM SERPL-MCNC: 9.7 MG/DL (ref 8.7–10.2)
CHLORIDE SERPL-SCNC: 101 MMOL/L (ref 96–106)
CHOLEST SERPL-MCNC: 191 MG/DL (ref 100–199)
CO2 SERPL-SCNC: 25 MMOL/L (ref 20–29)
CREAT SERPL-MCNC: 0.67 MG/DL (ref 0.57–1)
EGFRCR SERPLBLD CKD-EPI 2021: 104 ML/MIN/1.73
EOSINOPHIL # BLD AUTO: 0.1 X10E3/UL (ref 0–0.4)
EOSINOPHIL NFR BLD AUTO: 3 %
ERYTHROCYTE [DISTWIDTH] IN BLOOD BY AUTOMATED COUNT: 11.9 % (ref 11.7–15.4)
FOLATE SERPL-MCNC: >20 NG/ML
GLOBULIN SER CALC-MCNC: 2.9 G/DL (ref 1.5–4.5)
GLUCOSE SERPL-MCNC: 92 MG/DL (ref 70–99)
HBV DNA SERPL NAA+PROBE-ACNC: NORMAL IU/ML
HBV DNA SERPL NAA+PROBE-LOG IU: 4.67 LOG10 IU/ML
HCT VFR BLD AUTO: 44.1 % (ref 34–46.6)
HDLC SERPL-MCNC: 62 MG/DL
HGB BLD-MCNC: 15.2 G/DL (ref 11.1–15.9)
IMM GRANULOCYTES # BLD AUTO: 0 X10E3/UL (ref 0–0.1)
IMM GRANULOCYTES NFR BLD AUTO: 0 %
LDLC SERPL CALC-MCNC: 115 MG/DL (ref 0–99)
LYMPHOCYTES # BLD AUTO: 1.6 X10E3/UL (ref 0.7–3.1)
LYMPHOCYTES NFR BLD AUTO: 41 %
MCH RBC QN AUTO: 31.3 PG (ref 26.6–33)
MCHC RBC AUTO-ENTMCNC: 34.5 G/DL (ref 31.5–35.7)
MCV RBC AUTO: 91 FL (ref 79–97)
MONOCYTES # BLD AUTO: 0.4 X10E3/UL (ref 0.1–0.9)
MONOCYTES NFR BLD AUTO: 10 %
NEUTROPHILS # BLD AUTO: 1.8 X10E3/UL (ref 1.4–7)
NEUTROPHILS NFR BLD AUTO: 45 %
PLATELET # BLD AUTO: 171 X10E3/UL (ref 150–450)
POTASSIUM SERPL-SCNC: 4 MMOL/L (ref 3.5–5.2)
PROT SERPL-MCNC: 7.6 G/DL (ref 6–8.5)
RBC # BLD AUTO: 4.86 X10E6/UL (ref 3.77–5.28)
REF LAB TEST REF RANGE: NORMAL
SODIUM SERPL-SCNC: 140 MMOL/L (ref 134–144)
TRIGL SERPL-MCNC: 79 MG/DL (ref 0–149)
VIT B12 SERPL-MCNC: 728 PG/ML (ref 232–1245)
VLDLC SERPL CALC-MCNC: 14 MG/DL (ref 5–40)
WBC # BLD AUTO: 3.9 X10E3/UL (ref 3.4–10.8)

## 2024-07-01 ENCOUNTER — OFFICE VISIT (OUTPATIENT)
Dept: FAMILY MEDICINE CLINIC | Facility: CLINIC | Age: 54
End: 2024-07-01
Payer: COMMERCIAL

## 2024-07-01 VITALS
SYSTOLIC BLOOD PRESSURE: 126 MMHG | DIASTOLIC BLOOD PRESSURE: 78 MMHG | OXYGEN SATURATION: 100 % | WEIGHT: 140 LBS | BODY MASS INDEX: 25.76 KG/M2 | HEIGHT: 62 IN | HEART RATE: 70 BPM | RESPIRATION RATE: 16 BRPM | TEMPERATURE: 97.4 F

## 2024-07-01 DIAGNOSIS — R79.89 LFT ELEVATION: ICD-10-CM

## 2024-07-01 DIAGNOSIS — Z12.31 ENCOUNTER FOR SCREENING MAMMOGRAM FOR BREAST CANCER: ICD-10-CM

## 2024-07-01 DIAGNOSIS — Z86.19 HISTORY OF HEPATITIS B: ICD-10-CM

## 2024-07-01 DIAGNOSIS — Z86.19 HISTORY OF HERPES GENITALIS: ICD-10-CM

## 2024-07-01 DIAGNOSIS — R79.89 ABNORMAL THYROID BLOOD TEST: Primary | ICD-10-CM

## 2024-07-01 DIAGNOSIS — Z12.11 SCREEN FOR COLON CANCER: ICD-10-CM

## 2024-07-01 DIAGNOSIS — I49.3 PVC (PREMATURE VENTRICULAR CONTRACTION): ICD-10-CM

## 2024-07-01 DIAGNOSIS — E55.9 VITAMIN D DEFICIENCY: ICD-10-CM

## 2024-07-01 PROCEDURE — 99214 OFFICE O/P EST MOD 30 MIN: CPT | Performed by: PHYSICIAN ASSISTANT

## 2024-07-01 RX ORDER — VALACYCLOVIR HYDROCHLORIDE 500 MG/1
TABLET, FILM COATED ORAL
Qty: 90 TABLET | Refills: 3 | Status: SHIPPED | OUTPATIENT
Start: 2024-07-01

## 2024-07-01 RX ORDER — VIT C/B6/B5/MAGNESIUM/HERB 173 50-5-6-5MG
CAPSULE ORAL
COMMUNITY

## 2024-07-01 RX ORDER — VIT C/HESPERIDIN/BIOFLAVONOIDS 500-100 MG
TABLET ORAL
COMMUNITY
End: 2024-07-01

## 2024-07-01 NOTE — PROGRESS NOTES
"Subjective   Brittaney Augustin is a 54 y.o. female.     Anxiety   Patient reports no suicidal ideas.         Since the last visit, she has overall felt well.  She has Hyperlipidemia and working on this with diet and exercise and Vitamin D deficiency and will update labs for continued management.  she has been compliant with current medications have reviewed them.  The patient denies medication side effects.  Will refill medications. /78   Pulse 70   Temp 97.4 °F (36.3 °C)   Resp 16   Ht 157.5 cm (62\")   Wt 63.5 kg (140 lb)   SpO2 100%   BMI 25.61 kg/m² .  BMI is >= 25 and <30. (Overweight) The following options were offered after discussion;: weight loss educational material (shared in after visit summary), exercise counseling/recommendations, and nutrition counseling/recommendations      Results for orders placed or performed in visit on 01/08/24   Hepatitis B DNA, Quantitative, PCR    Specimen: Blood   Result Value Ref Range    HBV IU/mL 46,700 IU/mL    log10 HBV IU/mL 4.669 log10 IU/mL    Test Information Comment    Comprehensive metabolic panel    Specimen: Blood   Result Value Ref Range    Glucose 92 70 - 99 mg/dL    BUN 9 6 - 24 mg/dL    Creatinine 0.67 0.57 - 1.00 mg/dL    EGFR Result 104 >59 mL/min/1.73    BUN/Creatinine Ratio 13 9 - 23    Sodium 140 134 - 144 mmol/L    Potassium 4.0 3.5 - 5.2 mmol/L    Chloride 101 96 - 106 mmol/L    Total CO2 25 20 - 29 mmol/L    Calcium 9.7 8.7 - 10.2 mg/dL    Total Protein 7.6 6.0 - 8.5 g/dL    Albumin 4.7 3.8 - 4.9 g/dL    Globulin 2.9 1.5 - 4.5 g/dL    A/G Ratio 1.6 1.2 - 2.2    Total Bilirubin 0.6 0.0 - 1.2 mg/dL    Alkaline Phosphatase 64 44 - 121 IU/L    AST (SGOT) 24 0 - 40 IU/L    ALT (SGPT) 22 0 - 32 IU/L   Lipid panel    Specimen: Blood   Result Value Ref Range    Total Cholesterol 191 100 - 199 mg/dL    Triglycerides 79 0 - 149 mg/dL    HDL Cholesterol 62 >39 mg/dL    VLDL Cholesterol Nba 14 5 - 40 mg/dL    LDL Chol Calc ([facility]) 115 (H) 0 - 99 " mg/dL   Vitamin D,25-Hydroxy    Specimen: Blood   Result Value Ref Range    25 Hydroxy, Vitamin D 21.3 (L) 30.0 - 100.0 ng/mL   Vitamin B12    Specimen: Blood   Result Value Ref Range    Vitamin B-12 728 232 - 1,245 pg/mL   Folate    Specimen: Blood   Result Value Ref Range    Folate >20.0 >3.0 ng/mL   CBC and Differential    Specimen: Blood   Result Value Ref Range    WBC 3.9 3.4 - 10.8 x10E3/uL    RBC 4.86 3.77 - 5.28 x10E6/uL    Hemoglobin 15.2 11.1 - 15.9 g/dL    Hematocrit 44.1 34.0 - 46.6 %    MCV 91 79 - 97 fL    MCH 31.3 26.6 - 33.0 pg    MCHC 34.5 31.5 - 35.7 g/dL    RDW 11.9 11.7 - 15.4 %    Platelets 171 150 - 450 x10E3/uL    Neutrophil Rel % 45 Not Estab. %    Lymphocyte Rel % 41 Not Estab. %    Monocyte Rel % 10 Not Estab. %    Eosinophil Rel % 3 Not Estab. %    Basophil Rel % 1 Not Estab. %    Neutrophils Absolute 1.8 1.4 - 7.0 x10E3/uL    Lymphocytes Absolute 1.6 0.7 - 3.1 x10E3/uL    Monocytes Absolute 0.4 0.1 - 0.9 x10E3/uL    Eosinophils Absolute 0.1 0.0 - 0.4 x10E3/uL    Basophils Absolute 0.0 0.0 - 0.2 x10E3/uL    Immature Granulocyte Rel % 0 Not Estab. %    Immature Grans Absolute 0.0 0.0 - 0.1 x10E3/uL       I did update her hepatitis labs last visit  I have also been monitoring her free T4 this was initially elevated when cardiology checked it on 12/19/2023 at 1.75 I repeated this lab on 12/26/2023 and was in range at 1.67 but need to continue to watch this and repeat her TSH and free T3 again along with thyroid antibodies----they were negative last labs on 12/26/2023------ will continue to monitor for thyroid disorder.---Q 6 mos    Only snores laying on back  I did f/u labs for thyroid 12-26-23--normal-----cardio ordered thyroid labs d/t palpitations work up---free T4 was slightly elevated--I repeated labs and got ab   Last visit cardio---5-23-24----saw Dr Bustamante ----f/u PVCs.   Plan:  Her notes:  Frequent PVCs. Patient with PVCs on ECG today which appear to be left bundle, inferior axis  morphology.  She has normal ejection fraction no significant valve normalities on echo. Nuclear stress test was normal.  She does not have any significant symptoms from PVCs currently. We will obtain ZIO event monitor to evaluate her burden as well as for any significant symptomatic bradycardia.    Discussed options of antiarrhythmic therapy or ablation procedure for PVCs given frequent and/or more symptomatic.  She is not interested in long-term medication therapy however may be interested in ablation down the road    RTC in 6 months     Her Zio results 1 week with 12.2 PCVs-------has appt cardio---pt reports less symptoms of this.  Note had stress test and echo 1-25-24.    Contracted Hep B at birth    Walking 2 times a week    The following portions of the patient's history were reviewed and updated as appropriate: allergies, current medications, past family history, past medical history, past social history, past surgical history, and problem list.    Review of Systems   Constitutional:  Negative for diaphoresis.   HENT:  Negative for nosebleeds and trouble swallowing.    Eyes:  Negative for blurred vision and visual disturbance.   Respiratory:  Negative for choking.    Gastrointestinal:  Negative for blood in stool.   Allergic/Immunologic: Negative for immunocompromised state.   Neurological:  Negative for facial asymmetry and speech difficulty.   Psychiatric/Behavioral:  Negative for self-injury and suicidal ideas.        Objective   Physical Exam  Vitals and nursing note reviewed.   Constitutional:       General: She is not in acute distress.     Appearance: She is well-developed. She is not ill-appearing or toxic-appearing.   HENT:      Head: Normocephalic.      Right Ear: External ear normal.      Left Ear: External ear normal.      Nose: Nose normal.      Mouth/Throat:      Pharynx: Oropharynx is clear.   Eyes:      General: No scleral icterus.     Conjunctiva/sclera: Conjunctivae normal.      Pupils:  Pupils are equal, round, and reactive to light.   Neck:      Thyroid: No thyromegaly.   Cardiovascular:      Pulses: Normal pulses.      Heart sounds: No murmur heard.     Comments: Regular rate and rhythm with PVCs every 2-3 beats  Pulmonary:      Effort: Pulmonary effort is normal. No respiratory distress.      Breath sounds: Normal breath sounds.   Musculoskeletal:         General: No deformity. Normal range of motion.      Cervical back: Normal range of motion and neck supple.   Skin:     General: Skin is warm and dry.      Findings: No rash.   Neurological:      General: No focal deficit present.      Mental Status: She is alert and oriented to person, place, and time. Mental status is at baseline.   Psychiatric:         Mood and Affect: Mood normal.         Behavior: Behavior normal.         Thought Content: Thought content normal.         Judgment: Judgment normal.           Assessment & Plan   Diagnoses and all orders for this visit:    1. Abnormal thyroid blood test (Primary)  -     T3, Free  -     T4, Free  -     TSH  -     Thyroid Antibodies  -     Comprehensive Metabolic Panel  -     Hemoglobin A1c  -     CBC & Differential  -     Vitamin D,25-Hydroxy    2. PVC (premature ventricular contraction)  Comments:  per [facility] cardiologist  Orders:  -     T3, Free  -     T4, Free  -     TSH  -     Thyroid Antibodies  -     Comprehensive Metabolic Panel  -     Hemoglobin A1c  -     CBC & Differential  -     Vitamin D,25-Hydroxy    3. Vitamin D deficiency  -     T3, Free  -     T4, Free  -     TSH  -     Thyroid Antibodies  -     Comprehensive Metabolic Panel  -     Hemoglobin A1c  -     CBC & Differential  -     Vitamin D,25-Hydroxy    4. Screen for colon cancer  -     Cologuard - Stool, Per Rectum; Future    5. Encounter for screening mammogram for breast cancer  -     Mammo Screening Digital Tomosynthesis Bilateral With CAD    6. History of herpes genitalis  -     valACYclovir (VALTREX) 500 MG tablet; Take 1  tablet by mouth daily  Dispense: 90 tablet; Refill: 3        Just do Vit D only 2,000 IU daily----she wants f/u on lab  Plan to update labs and continue to monitor thyroid function due to the free T4 level elevated 12/19/2023 and plan to check thyroid functions every 6 months  Labs show low Vitamin D levels.  I want you to add OTC Vitamin D 2,000 I.U. Daily.------get labs  She is taking 200 IUof vit D   Hydroxyzine if needed as needed for acute anxiety has not taken yet  Placing refills on Valtrex for treatment of HSV as needed working well   Screening colon cancer with Cologuard and screening mammogram ordered  Kevon pap      Answers submitted by the patient for this visit:  Other (Submitted on 6/25/2024)  Please describe your symptoms.: Bloodwork snf follow up from previous visit.  Have you had these symptoms before?: No  How long have you been having these symptoms?: Greater than 2 weeks  Primary Reason for Visit (Submitted on 6/25/2024)  What is the primary reason for your visit?: Other

## 2024-07-03 LAB
25(OH)D3+25(OH)D2 SERPL-MCNC: 34.8 NG/ML (ref 30–100)
ALBUMIN SERPL-MCNC: 4.6 G/DL (ref 3.8–4.9)
ALP SERPL-CCNC: 87 IU/L (ref 44–121)
ALT SERPL-CCNC: 162 IU/L (ref 0–32)
AST SERPL-CCNC: 136 IU/L (ref 0–40)
BASOPHILS # BLD AUTO: 0 X10E3/UL (ref 0–0.2)
BASOPHILS NFR BLD AUTO: 1 %
BILIRUB SERPL-MCNC: 0.4 MG/DL (ref 0–1.2)
BUN SERPL-MCNC: 11 MG/DL (ref 6–24)
BUN/CREAT SERPL: 18 (ref 9–23)
CALCIUM SERPL-MCNC: 9.9 MG/DL (ref 8.7–10.2)
CHLORIDE SERPL-SCNC: 101 MMOL/L (ref 96–106)
CO2 SERPL-SCNC: 22 MMOL/L (ref 20–29)
CREAT SERPL-MCNC: 0.61 MG/DL (ref 0.57–1)
EGFRCR SERPLBLD CKD-EPI 2021: 106 ML/MIN/1.73
EOSINOPHIL # BLD AUTO: 0.2 X10E3/UL (ref 0–0.4)
EOSINOPHIL NFR BLD AUTO: 3 %
ERYTHROCYTE [DISTWIDTH] IN BLOOD BY AUTOMATED COUNT: 12 % (ref 11.7–15.4)
GLOBULIN SER CALC-MCNC: 3.5 G/DL (ref 1.5–4.5)
GLUCOSE SERPL-MCNC: 92 MG/DL (ref 70–99)
HBA1C MFR BLD: 5.3 % (ref 4.8–5.6)
HCT VFR BLD AUTO: 47.3 % (ref 34–46.6)
HGB BLD-MCNC: 15.5 G/DL (ref 11.1–15.9)
IMM GRANULOCYTES # BLD AUTO: 0 X10E3/UL (ref 0–0.1)
IMM GRANULOCYTES NFR BLD AUTO: 0 %
LYMPHOCYTES # BLD AUTO: 2 X10E3/UL (ref 0.7–3.1)
LYMPHOCYTES NFR BLD AUTO: 34 %
MCH RBC QN AUTO: 30.6 PG (ref 26.6–33)
MCHC RBC AUTO-ENTMCNC: 32.8 G/DL (ref 31.5–35.7)
MCV RBC AUTO: 93 FL (ref 79–97)
MONOCYTES # BLD AUTO: 0.5 X10E3/UL (ref 0.1–0.9)
MONOCYTES NFR BLD AUTO: 9 %
NEUTROPHILS # BLD AUTO: 3.1 X10E3/UL (ref 1.4–7)
NEUTROPHILS NFR BLD AUTO: 53 %
PLATELET # BLD AUTO: 181 X10E3/UL (ref 150–450)
POTASSIUM SERPL-SCNC: 4.7 MMOL/L (ref 3.5–5.2)
PROT SERPL-MCNC: 8.1 G/DL (ref 6–8.5)
RBC # BLD AUTO: 5.07 X10E6/UL (ref 3.77–5.28)
SODIUM SERPL-SCNC: 142 MMOL/L (ref 134–144)
T3FREE SERPL-MCNC: 3.6 PG/ML (ref 2–4.4)
T4 FREE SERPL-MCNC: 1.33 NG/DL (ref 0.82–1.77)
THYROGLOB AB SERPL-ACNC: <1 IU/ML (ref 0–0.9)
THYROPEROXIDASE AB SERPL-ACNC: 24 IU/ML (ref 0–34)
TSH SERPL DL<=0.005 MIU/L-ACNC: 2.21 UIU/ML (ref 0.45–4.5)
WBC # BLD AUTO: 5.8 X10E3/UL (ref 3.4–10.8)

## 2024-07-05 ENCOUNTER — TELEPHONE (OUTPATIENT)
Dept: FAMILY MEDICINE CLINIC | Facility: CLINIC | Age: 54
End: 2024-07-05
Payer: COMMERCIAL

## 2024-07-05 LAB
EBV NA IGG SER IA-ACNC: 74 U/ML (ref 0–17.9)
EBV VCA IGG SER IA-ACNC: 82.6 U/ML (ref 0–17.9)
EBV VCA IGM SER IA-ACNC: <36 U/ML (ref 0–35.9)
HAV IGM SERPL QL IA: NEGATIVE
HBV CORE AB SERPL QL IA: POSITIVE
HBV CORE IGM SERPL QL IA: NEGATIVE
HBV SURFACE AG SERPL QL IA: POSITIVE
HCV AB SERPL QL IA: NORMAL
HCV IGG SERPL QL IA: NON REACTIVE
SERVICE CMNT-IMP: ABNORMAL
WRITTEN AUTHORIZATION: NORMAL

## 2024-07-05 NOTE — TELEPHONE ENCOUNTER
[facility] staff attempted to follow warm transfer process and was unsuccessful     Caller: Brittaney Augustin    Relationship to patient: Self    Best call back number: [phone]     Patient is needing: TO SCHEDULE AN APPOINTMENT FOR LABS

## 2024-07-05 NOTE — TELEPHONE ENCOUNTER
Spoke with patient to inform her that her provider has ordered the referring test to her previous lab order on July 1 and once they have been resulted someone from our office will contact her. Patient voiced understanding and had no further question.

## 2024-07-09 ENCOUNTER — LAB (OUTPATIENT)
Dept: LAB | Facility: HOSPITAL | Age: 54
End: 2024-07-09
Payer: COMMERCIAL

## 2024-07-09 ENCOUNTER — OFFICE VISIT (OUTPATIENT)
Dept: GASTROENTEROLOGY | Facility: CLINIC | Age: 54
End: 2024-07-09
Payer: COMMERCIAL

## 2024-07-09 VITALS
SYSTOLIC BLOOD PRESSURE: 130 MMHG | WEIGHT: 138.4 LBS | BODY MASS INDEX: 25.47 KG/M2 | HEIGHT: 62 IN | DIASTOLIC BLOOD PRESSURE: 80 MMHG

## 2024-07-09 DIAGNOSIS — B18.1 CHRONIC HEPATITIS B VIRUS INFECTION: ICD-10-CM

## 2024-07-09 DIAGNOSIS — R74.01 ELEVATED TRANSAMINASE LEVEL: ICD-10-CM

## 2024-07-09 DIAGNOSIS — R74.01 ELEVATED TRANSAMINASE LEVEL: Primary | ICD-10-CM

## 2024-07-09 LAB
ALBUMIN SERPL-MCNC: 4.4 G/DL (ref 3.5–5.2)
ALBUMIN/GLOB SERPL: 1.3 G/DL
ALP SERPL-CCNC: 71 U/L (ref 39–117)
ALT SERPL W P-5'-P-CCNC: 111 U/L (ref 1–33)
ANION GAP SERPL CALCULATED.3IONS-SCNC: 10 MMOL/L (ref 5–15)
AST SERPL-CCNC: 89 U/L (ref 1–32)
BILIRUB SERPL-MCNC: 0.5 MG/DL (ref 0–1.2)
BUN SERPL-MCNC: 9 MG/DL (ref 6–20)
BUN/CREAT SERPL: 14.1 (ref 7–25)
CALCIUM SPEC-SCNC: 9.5 MG/DL (ref 8.6–10.5)
CHLORIDE SERPL-SCNC: 103 MMOL/L (ref 98–107)
CHOLEST SERPL-MCNC: 171 MG/DL (ref 0–200)
CO2 SERPL-SCNC: 28 MMOL/L (ref 22–29)
CREAT SERPL-MCNC: 0.64 MG/DL (ref 0.57–1)
EGFRCR SERPLBLD CKD-EPI 2021: 104.5 ML/MIN/1.73
GLOBULIN UR ELPH-MCNC: 3.4 GM/DL
GLUCOSE SERPL-MCNC: 92 MG/DL (ref 65–99)
HDLC SERPL-MCNC: 67 MG/DL (ref 40–60)
HIV 1+2 AB+HIV1 P24 AG SERPL QL IA: NORMAL
LDLC SERPL CALC-MCNC: 93 MG/DL (ref 0–100)
LDLC/HDLC SERPL: 1.39 {RATIO}
POTASSIUM SERPL-SCNC: 4 MMOL/L (ref 3.5–5.2)
PROT SERPL-MCNC: 7.8 G/DL (ref 6–8.5)
SODIUM SERPL-SCNC: 141 MMOL/L (ref 136–145)
TRIGL SERPL-MCNC: 54 MG/DL (ref 0–150)
VLDLC SERPL-MCNC: 11 MG/DL (ref 5–40)

## 2024-07-09 PROCEDURE — G0432 EIA HIV-1/HIV-2 SCREEN: HCPCS

## 2024-07-09 PROCEDURE — 86707 HEPATITIS BE ANTIBODY: CPT | Performed by: STUDENT IN AN ORGANIZED HEALTH CARE EDUCATION/TRAINING PROGRAM

## 2024-07-09 PROCEDURE — 99204 OFFICE O/P NEW MOD 45 MIN: CPT | Performed by: STUDENT IN AN ORGANIZED HEALTH CARE EDUCATION/TRAINING PROGRAM

## 2024-07-09 PROCEDURE — 87350 HEPATITIS BE AG IA: CPT | Performed by: STUDENT IN AN ORGANIZED HEALTH CARE EDUCATION/TRAINING PROGRAM

## 2024-07-09 PROCEDURE — 80053 COMPREHEN METABOLIC PANEL: CPT | Performed by: STUDENT IN AN ORGANIZED HEALTH CARE EDUCATION/TRAINING PROGRAM

## 2024-07-09 PROCEDURE — 36415 COLL VENOUS BLD VENIPUNCTURE: CPT | Performed by: STUDENT IN AN ORGANIZED HEALTH CARE EDUCATION/TRAINING PROGRAM

## 2024-07-09 PROCEDURE — 86015 ACTIN ANTIBODY EACH: CPT | Performed by: STUDENT IN AN ORGANIZED HEALTH CARE EDUCATION/TRAINING PROGRAM

## 2024-07-09 PROCEDURE — 80061 LIPID PANEL: CPT | Performed by: STUDENT IN AN ORGANIZED HEALTH CARE EDUCATION/TRAINING PROGRAM

## 2024-07-09 PROCEDURE — 86381 MITOCHONDRIAL ANTIBODY EACH: CPT | Performed by: STUDENT IN AN ORGANIZED HEALTH CARE EDUCATION/TRAINING PROGRAM

## 2024-07-09 PROCEDURE — 87517 HEPATITIS B DNA QUANT: CPT | Performed by: STUDENT IN AN ORGANIZED HEALTH CARE EDUCATION/TRAINING PROGRAM

## 2024-07-09 PROCEDURE — 86692 HEPATITIS DELTA AGENT ANTBDY: CPT

## 2024-07-09 PROCEDURE — 82787 IGG 1 2 3 OR 4 EACH: CPT

## 2024-07-09 NOTE — ASSESSMENT & PLAN NOTE
- Could be due to Valacyclovir as she took prior to having labs obtained. Obtain CMP to further evaluate for improvement   - Obtain HBeAg, HBeAb, HBV DNA level, Hepatitis Delta level   - Obtain HIV PCR & Ab  - Obtain US Liver to screen for HCC and Fibroscan to further evaluate for cirrhosis    - May require treatment of hepatitis B given elevated LFTs

## 2024-07-09 NOTE — PROGRESS NOTES
Brittaney Augustin is a 54 y.o. female with a past medical history of Chronic HBV (perinatally transmitted) + noted below who presents for evaluation of   Chief Complaint   Patient presents with    Elevated Hepatic Enzymes    Hepatitis B       Subjective     # Elevated Transaminases   # Chronic Hepatitis B   - Noted increase in LFTs on  from previously being normal. LFTs from  were notable for , , T Bili 0.4 (nml), ALP 87 (nml).   - Reports taking  Valacyclovir 1000 mg the week prior to having these labs obtained given her concern for a genital herpes outbreak. Otherwise denies any new medications.   - Noted acute hepatitis panel from  was notable for HBsAg + and HBcAb total positive with negative HBcAb IgM.   - Noted HBV DNA of 46,700 on 1/10.     # Screening for colon cancer   - Reports she has a pending Cologuard that hasn't resulted yet but she sent in a week ago.               Past Medical History:   Diagnosis Date    Chronic hepatitis B virus infection     Colonoscopy refused     Genital herpes     History of DVT (deep vein thrombosis) 2015    blood clot in left forearm (possibly female hormone related)    HPV in female     PVC's (premature ventricular contractions)          Current Outpatient Medications:     hydrOXYzine (ATARAX) 10 MG tablet, 1-2 tabs PO Q 8 hours PRN anxiety, Disp: 100 tablet, Rfl: 5    MILK THISTLE PO, Take  by mouth., Disp: , Rfl:     Turmeric 500 MG capsule, Take  by mouth., Disp: , Rfl:     valACYclovir (VALTREX) 500 MG tablet, Take 1 tablet by mouth daily, Disp: 90 tablet, Rfl: 3    No Known Allergies    Social History     Socioeconomic History    Marital status:    Tobacco Use    Smoking status: Never     Passive exposure: Never    Smokeless tobacco: Never    Tobacco comments:     Never tried any form of tobacco.   Vaping Use    Vaping status: Never Used   Substance and Sexual Activity    Alcohol use: Not Currently     Comment: 2- 3 beers a  month (none currently)    Drug use: Never    Sexual activity: Yes     Partners: Male     Birth control/protection: Tubal ligation     Comment: Essure Procedure, sterilization       Family History   Problem Relation Age of Onset    Hypertension Mother     Liver disease Mother         Chronic since birth    No Known Problems Father     Liver disease Brother         Chronic since birth    Liver disease Brother         Chronic since birth    Breast cancer Neg Hx     Ovarian cancer Neg Hx        Objective     Vitals:    07/09/24 1420   BP: 130/80         07/09/24  1420   Weight: 62.8 kg (138 lb 6.4 oz)     Body mass index is 25.31 kg/m².    Physical Exam  Vitals reviewed.   Constitutional:       Appearance: Normal appearance.   HENT:      Head: Normocephalic and atraumatic.   Eyes:      Extraocular Movements: Extraocular movements intact.      Conjunctiva/sclera: Conjunctivae normal.   Cardiovascular:      Rate and Rhythm: Normal rate and regular rhythm.      Heart sounds: Normal heart sounds.   Pulmonary:      Effort: Pulmonary effort is normal.      Breath sounds: Normal breath sounds.   Abdominal:      General: Abdomen is flat. Bowel sounds are normal. There is no distension.      Palpations: Abdomen is soft.      Tenderness: There is no abdominal tenderness.   Neurological:      Mental Status: She is alert.   Psychiatric:         Mood and Affect: Mood normal.         Behavior: Behavior normal.         WBC   Date Value Ref Range Status   07/01/2024 5.8 3.4 - 10.8 x10E3/uL Final   10/08/2021 5.97 4.5 - 11.0 10*3/uL Final     RBC   Date Value Ref Range Status   07/01/2024 5.07 3.77 - 5.28 x10E6/uL Final   10/08/2021 4.62 4.0 - 5.2 10*6/uL Final     Hemoglobin   Date Value Ref Range Status   07/01/2024 15.5 11.1 - 15.9 g/dL Final   10/08/2021 14.6 12.0 - 16.0 g/dL Final     Hematocrit   Date Value Ref Range Status   07/01/2024 47.3 (H) 34.0 - 46.6 % Final   10/08/2021 42.8 36.0 - 46.0 % Final     MCV   Date Value Ref  Range Status   07/01/2024 93 79 - 97 fL Final   10/08/2021 92.6 80.0 - 100.0 fL Final     MCH   Date Value Ref Range Status   07/01/2024 30.6 26.6 - 33.0 pg Final   10/08/2021 31.6 26.0 - 34.0 pg Final     MCHC   Date Value Ref Range Status   07/01/2024 32.8 31.5 - 35.7 g/dL Final   10/08/2021 34.1 31.0 - 37.0 g/dL Final     RDW   Date Value Ref Range Status   07/01/2024 12.0 11.7 - 15.4 % Final   10/08/2021 12.5 12.0 - 16.8 % Final     RDW-SD   Date Value Ref Range Status   06/19/2019 47.6 37.0 - 54.0 fl Final     MPV   Date Value Ref Range Status   10/08/2021 10.7 8.4 - 12.4 fL Final     Platelets   Date Value Ref Range Status   07/01/2024 181 150 - 450 x10E3/uL Final   10/08/2021 203 140 - 440 10*3/uL Final     Neutrophil Rel %   Date Value Ref Range Status   07/01/2024 53 Not Estab. % Final   10/08/2021 80.2 (H) 45 - 80 % Final     Lymphocyte Rel %   Date Value Ref Range Status   07/01/2024 34 Not Estab. % Final   10/08/2021 15.1 15 - 50 % Final     Monocyte Rel %   Date Value Ref Range Status   07/01/2024 9 Not Estab. % Final   10/08/2021 3.5 0 - 15 % Final     Eosinophil Rel %   Date Value Ref Range Status   07/01/2024 3 Not Estab. % Final     Eosinophil %   Date Value Ref Range Status   10/08/2021 0.0 0 - 7 % Final     Basophil Rel %   Date Value Ref Range Status   07/01/2024 1 Not Estab. % Final   10/08/2021 0.7 0 - 2 % Final     Immature Grans %   Date Value Ref Range Status   10/08/2021 0.5 0.0 - 1.0 % Final     Neutrophils Absolute   Date Value Ref Range Status   07/01/2024 3.1 1.4 - 7.0 x10E3/uL Final   10/08/2021 4.79 2.0 - 8.8 10*3/uL Final     Lymphocytes Absolute   Date Value Ref Range Status   07/01/2024 2.0 0.7 - 3.1 x10E3/uL Final   10/08/2021 0.90 0.7 - 5.5 10*3/uL Final     Monocytes Absolute   Date Value Ref Range Status   07/01/2024 0.5 0.1 - 0.9 x10E3/uL Final   10/08/2021 0.21 0.0 - 1.7 10*3/uL Final     Eosinophils Absolute   Date Value Ref Range Status   07/01/2024 0.2 0.0 - 0.4 x10E3/uL  Final   10/08/2021 0.00 0.0 - 0.8 10*3/uL Final     Basophils Absolute   Date Value Ref Range Status   07/01/2024 0.0 0.0 - 0.2 x10E3/uL Final   10/08/2021 0.04 0.0 - 0.2 10*3/uL Final     Immature Grans, Absolute   Date Value Ref Range Status   10/08/2021 0.03 0.00 - 0.10 10*3/uL Final     nRBC   Date Value Ref Range Status   10/08/2021 0 0 /100(WBC) Final   06/19/2019 0.4 (H) 0.0 - 0.2 /100 WBC Final       Glucose   Date Value Ref Range Status   07/01/2024 92 70 - 99 mg/dL Final   06/18/2019 87 65 - 99 mg/dL Final     Sodium   Date Value Ref Range Status   07/01/2024 142 134 - 144 mmol/L Final   10/08/2021 142 136 - 145 mmol/L Final     Comment:     (note)  Excess protein and/or lipids can falsely decrease sodium levels  (pseudo hyponatremia).     Potassium   Date Value Ref Range Status   07/01/2024 4.7 3.5 - 5.2 mmol/L Final   10/08/2021 4.0 3.5 - 5.1 mmol/L Final     Total CO2   Date Value Ref Range Status   07/01/2024 22 20 - 29 mmol/L Final   10/08/2021 24 22 - 29 mmol/L Final     Chloride   Date Value Ref Range Status   07/01/2024 101 96 - 106 mmol/L Final   10/08/2021 107 98 - 107 mmol/L Final     Anion Gap   Date Value Ref Range Status   10/08/2021 11 5 - 13 (arb'U) Final     Comment:     (note)  Calculation- Na - (Cl + CO2)     Creatinine   Date Value Ref Range Status   07/01/2024 0.61 0.57 - 1.00 mg/dL Final   10/08/2021 0.64 0.55 - 1.02 mg/dL Final     BUN   Date Value Ref Range Status   07/01/2024 11 6 - 24 mg/dL Final   10/08/2021 8 (L) 10 - 20 mg/dL Final     BUN/Creatinine Ratio   Date Value Ref Range Status   07/01/2024 18 9 - 23 Final   10/08/2021 12.5 RATIO Final     Calcium   Date Value Ref Range Status   07/01/2024 9.9 8.7 - 10.2 mg/dL Final   10/08/2021 10.1 8.4 - 10.2 mg/dL Final     eGFR Non  Amer   Date Value Ref Range Status   06/18/2019 122 >60 mL/min/1.73 Final     Alkaline Phosphatase   Date Value Ref Range Status   07/01/2024 87 44 - 121 IU/L Final   10/08/2021 74 40 - 150 U/L  Final     Total Protein   Date Value Ref Range Status   10/08/2021 8.3 (H) 6.4 - 8.2 g/dL Final     ALT (SGPT)   Date Value Ref Range Status   07/01/2024 162 (H) 0 - 32 IU/L Final   10/08/2021 35 0 - 55 U/L Final     AST (SGOT)   Date Value Ref Range Status   07/01/2024 136 (H) 0 - 40 IU/L Final   10/08/2021 34 5 - 34 U/L Final     Total Bilirubin   Date Value Ref Range Status   07/01/2024 0.4 0.0 - 1.2 mg/dL Final   10/08/2021 0.5 0.2 - 1.2 mg/dL Final     Albumin   Date Value Ref Range Status   07/01/2024 4.6 3.8 - 4.9 g/dL Final   10/08/2021 4.3 3.5 - 5.2 g/dL Final     Globulin   Date Value Ref Range Status   10/08/2021 4.0 1.5 - 4.5 g/dL Final     A/G Ratio   Date Value Ref Range Status   01/10/2024 1.6 1.2 - 2.2 Final   10/08/2021 1.1 1.1 - 2.5 RATIO Final         Imaging Results (Last 7 Days)       ** No results found for the last 168 hours. **                   Assessment & Plan     Diagnoses and all orders for this visit:    1. Elevated transaminase level (Primary)  Assessment & Plan:  - Could be due to Valacyclovir as she took prior to having labs obtained. Obtain CMP to further evaluate for improvement   - Obtain HBeAg, HBeAb, HBV DNA level, Hepatitis Delta level   - Obtain HIV PCR & Ab  - Obtain US Liver to screen for HCC and Fibroscan to further evaluate for cirrhosis    - May require treatment of hepatitis B given elevated LFTs    Orders:  -     Hepatitis B E Antibody  -     Hepatitis B E Antigen  -     Hepatitis Delta Virus; Future  -     Hepatitis B DNA, Quantitative, PCR  -     Anti-Smooth Muscle Antibody Titer  -     IgG 4; Future  -     Mitochondrial Antibodies, M2  -     Comprehensive Metabolic Panel  -     Lipid Panel    2. Chronic hepatitis B virus infection  Assessment & Plan:  - Obtain HBeAg, HBeAb, HBV DNA level, Hepatitis Delta level   - Obtain HIV PCR & Ab  - Obtain US Liver and Fibroscan to further evaluate   - May require treatment of hepatitis B given elevated LFTs    Orders:  -      Hepatitis B E Antibody  -     Hepatitis B E Antigen  -     Hepatitis Delta Virus; Future  -     Hepatitis B DNA, Quantitative, PCR  -     HIV-1 / O / 2 Ag / Antibody 4th Generation; Future  -     US Liver; Future  -     Liver Elastography      RTC in 3 months     I have discussed the above plan with the patient.  They verbalize understanding and are in agreement with the plan.  They have been advised to contact the office for any questions, concerns, or changes related to their health.

## 2024-07-09 NOTE — ASSESSMENT & PLAN NOTE
- Obtain HBeAg, HBeAb, HBV DNA level, Hepatitis Delta level   - Obtain HIV PCR & Ab  - Obtain US Liver and Fibroscan to further evaluate   - May require treatment of hepatitis B given elevated LFTs

## 2024-07-10 LAB
HBV DNA SERPL NAA+PROBE-ACNC: NORMAL IU/ML
HBV DNA SERPL NAA+PROBE-LOG IU: 6.45 LOG10 IU/ML
HBV E AB SERPL QL IA: REACTIVE
HBV E AG SERPL QL IA: NEGATIVE
IGG4 SER-MCNC: 106 MG/DL (ref 2–96)
MITOCHONDRIA M2 IGG SER-ACNC: <20 UNITS (ref 0–20)
REF LAB TEST REF RANGE: NORMAL
SMA IGG SER-ACNC: 5 UNITS (ref 0–19)

## 2024-07-11 ENCOUNTER — HOSPITAL ENCOUNTER (OUTPATIENT)
Dept: MAMMOGRAPHY | Facility: HOSPITAL | Age: 54
Discharge: HOME | End: 2024-07-11
Admitting: PHYSICIAN ASSISTANT
Payer: COMMERCIAL

## 2024-07-11 DIAGNOSIS — R76.8 IGG4 SELECTIVELY HIGH IN PLASMA: ICD-10-CM

## 2024-07-11 DIAGNOSIS — R74.01 ELEVATED TRANSAMINASE LEVEL: ICD-10-CM

## 2024-07-11 DIAGNOSIS — B18.1 CHRONIC HEPATITIS B VIRUS INFECTION: Primary | ICD-10-CM

## 2024-07-11 LAB
HDV AB SER QL IA: NON REACTIVE
IMP & REVIEW OF LAB RESULTS: NORMAL

## 2024-07-11 PROCEDURE — 77063 BREAST TOMOSYNTHESIS BI: CPT

## 2024-07-11 PROCEDURE — 77067 SCR MAMMO BI INCL CAD: CPT

## 2024-07-11 RX ORDER — TENOFOVIR DISOPROXIL FUMARATE 300 MG/1
300 TABLET, FILM COATED ORAL DAILY
Qty: 30 TABLET | Refills: 11 | Status: SHIPPED | OUTPATIENT
Start: 2024-07-11

## 2024-07-12 NOTE — PROGRESS NOTES
- Hepatitis D IgG/IgM obtained for chronic HBV and elevated transaminases which were negative  - POC: please see previous result note's POC

## 2024-07-15 LAB — SPECIMEN STATUS: NORMAL

## 2024-08-06 ENCOUNTER — TELEPHONE (OUTPATIENT)
Dept: GASTROENTEROLOGY | Facility: CLINIC | Age: 54
End: 2024-08-06
Payer: COMMERCIAL

## 2024-08-06 DIAGNOSIS — B18.1 CHRONIC HEPATITIS B VIRUS INFECTION: ICD-10-CM

## 2024-08-06 DIAGNOSIS — D89.84 IGG4-RELATED SCLEROSING CHOLANGITIS: Primary | ICD-10-CM

## 2024-08-06 DIAGNOSIS — K83.09 IGG4-RELATED SCLEROSING CHOLANGITIS: Primary | ICD-10-CM

## 2024-08-06 RX ORDER — TENOFOVIR DISOPROXIL FUMARATE 300 MG/1
300 TABLET, FILM COATED ORAL DAILY
Qty: 90 TABLET | Refills: 3 | Status: SHIPPED | OUTPATIENT
Start: 2024-08-06

## 2024-08-06 RX ORDER — TENOFOVIR DISOPROXIL FUMARATE 300 MG/1
300 TABLET, FILM COATED ORAL DAILY
Qty: 30 TABLET | Refills: 11 | Status: SHIPPED | OUTPATIENT
Start: 2024-08-06 | End: 2024-08-06 | Stop reason: SDUPTHER

## 2024-08-06 NOTE — TELEPHONE ENCOUNTER
tenofovir (VIREAD) 300 MG tablet [[id number]] (Order [id number])     PT IS REQUESTING TODAYS RX TO BE CHANGED TO A 90 DAY SUPPLY INSTEAD OF THE 30 PER INSURANCE    SAME PHARM

## 2024-08-07 ENCOUNTER — TELEPHONE (OUTPATIENT)
Dept: GASTROENTEROLOGY | Facility: CLINIC | Age: 54
End: 2024-08-07
Payer: COMMERCIAL

## 2024-08-07 NOTE — TELEPHONE ENCOUNTER
Pt called stating that [facility] is not recognizing the corrected RX we sent in for her Viread 90 days rather than 30.  I spoke with Will [facility] @ [facility], an advised that we transmitted Viread, 300 mg, 1 po daily, #90 with 3 refills on 8/6/24 @ 12:06pm.  Will assured me that he is getting 90 day RX ready to ship to pt today. I advised pt.

## 2024-08-30 ENCOUNTER — TELEPHONE (OUTPATIENT)
Dept: FAMILY MEDICINE CLINIC | Facility: CLINIC | Age: 54
End: 2024-08-30
Payer: COMMERCIAL

## 2024-08-30 DIAGNOSIS — Z86.19 HISTORY OF HERPES GENITALIS: ICD-10-CM

## 2024-08-30 NOTE — TELEPHONE ENCOUNTER
Caller: Brittaney Augustin    Relationship: Self    Best call back number: [phone]*    What medication are you requesting: hydrOXYzine (ATARAX) 10 MG tablet , AND valACYclovir (VALTREX) 500 MG tablet     If a prescription is needed, what is your preferred pharmacy and phone number: [facility] - [facility] [address] [phone] [facility] [phone]      Additional notes: PATIENT NOW USING [facility] DUE TO INSURANCE, AND WILL NEED NEW 90-DAY PRESCRIPTIONS SENT TO [facility].

## 2024-08-31 RX ORDER — VALACYCLOVIR HYDROCHLORIDE 500 MG/1
TABLET, FILM COATED ORAL
Qty: 90 TABLET | Refills: 3 | Status: SHIPPED | OUTPATIENT
Start: 2024-08-31

## 2024-08-31 RX ORDER — HYDROXYZINE HYDROCHLORIDE 10 MG/1
TABLET, FILM COATED ORAL
Qty: 100 TABLET | Refills: 5 | Status: SHIPPED | OUTPATIENT
Start: 2024-08-31

## 2024-09-03 RX ORDER — HYDROXYZINE HYDROCHLORIDE 10 MG/1
TABLET, FILM COATED ORAL
Qty: 540 TABLET | Refills: 1 | Status: SHIPPED | OUTPATIENT
Start: 2024-09-03

## 2024-12-30 ENCOUNTER — PATIENT MESSAGE (OUTPATIENT)
Dept: GASTROENTEROLOGY | Facility: CLINIC | Age: 54
End: 2024-12-30
Payer: COMMERCIAL

## 2025-02-24 ENCOUNTER — TELEPHONE (OUTPATIENT)
Dept: FAMILY MEDICINE CLINIC | Facility: CLINIC | Age: 55
End: 2025-02-24
Payer: COMMERCIAL

## 2025-02-24 DIAGNOSIS — E78.2 HYPERLIPIDEMIA, MIXED: ICD-10-CM

## 2025-02-24 DIAGNOSIS — E55.9 VITAMIN D DEFICIENCY: Primary | ICD-10-CM

## 2025-02-24 NOTE — TELEPHONE ENCOUNTER
Caller: Brittaney Augustin    Relationship: Self    Best call back number: [phone]     What orders are you requesting (i.e. lab or imaging): BLOOD TYPING, CHOLESTEROL, VITAMIN D, A1C    In what timeframe would the patient need to come in: ASAP    Where will you receive your lab/imaging services: [facility]    Additional notes: PATIENT STATES THAT SHE WOULD LIKE TO HAVE AN ORDER PLACED FOR BLOOD TYPING, CHOLESTEROL, VITAMIN D, A1C.     PATIENT STATES SHE FOUND OUT THAT HER FATHER IS NOT HER BIOLOGICAL FATHER AND THAT IS WHY SHE WOULD LIKE TO GET A BLOOD TYPING DONE.     PLEASE CALL PATIENT TO LET HER KNOW WHEN ORDERS HAVE BEEN PLACED.

## 2025-02-24 NOTE — TELEPHONE ENCOUNTER
I do not mind updating her labs for cholesterol, vitamin D, A1c...... her insurance will not pay for blood typing and that she is having surgery.  She can donate blood at the [facility] and they will tell her for free what her blood type is

## 2025-02-25 NOTE — TELEPHONE ENCOUNTER
Called patient to inform. She did not want to make an appt, but will go to a [facility] facility at her convenience to have them done

## 2025-03-09 LAB
25(OH)D3+25(OH)D2 SERPL-MCNC: 47.3 NG/ML (ref 30–100)
CHOLEST SERPL-MCNC: 213 MG/DL (ref 100–199)
HBA1C MFR BLD: 5.2 % (ref 4.8–5.6)
HDLC SERPL-MCNC: 76 MG/DL
LDLC SERPL CALC-MCNC: 124 MG/DL (ref 0–99)
TRIGL SERPL-MCNC: 71 MG/DL (ref 0–149)
VLDLC SERPL CALC-MCNC: 13 MG/DL (ref 5–40)